# Patient Record
Sex: MALE | Race: WHITE | NOT HISPANIC OR LATINO | Employment: OTHER | URBAN - METROPOLITAN AREA
[De-identification: names, ages, dates, MRNs, and addresses within clinical notes are randomized per-mention and may not be internally consistent; named-entity substitution may affect disease eponyms.]

---

## 2020-01-01 ENCOUNTER — APPOINTMENT (OUTPATIENT)
Dept: CARDIOLOGY | Facility: MEDICAL CENTER | Age: 60
DRG: 064 | End: 2020-01-01
Attending: PSYCHIATRY & NEUROLOGY
Payer: COMMERCIAL

## 2020-01-01 ENCOUNTER — ANESTHESIA (OUTPATIENT)
Dept: ANESTHESIOLOGY | Facility: MEDICAL CENTER | Age: 60
DRG: 064 | End: 2020-01-01

## 2020-01-01 ENCOUNTER — APPOINTMENT (OUTPATIENT)
Dept: RADIOLOGY | Facility: MEDICAL CENTER | Age: 60
DRG: 064 | End: 2020-01-01
Attending: PSYCHIATRY & NEUROLOGY

## 2020-01-01 ENCOUNTER — APPOINTMENT (OUTPATIENT)
Dept: RADIOLOGY | Facility: MEDICAL CENTER | Age: 60
DRG: 064 | End: 2020-01-01
Attending: EMERGENCY MEDICINE

## 2020-01-01 ENCOUNTER — ANESTHESIA EVENT (OUTPATIENT)
Dept: ANESTHESIOLOGY | Facility: MEDICAL CENTER | Age: 60
DRG: 064 | End: 2020-01-01

## 2020-01-01 ENCOUNTER — HOSPITAL ENCOUNTER (INPATIENT)
Facility: MEDICAL CENTER | Age: 60
LOS: 1 days | DRG: 064 | End: 2020-09-13
Attending: EMERGENCY MEDICINE | Admitting: PSYCHIATRY & NEUROLOGY
Payer: COMMERCIAL

## 2020-01-01 ENCOUNTER — OFFICE VISIT (OUTPATIENT)
Dept: URGENT CARE | Facility: CLINIC | Age: 60
End: 2020-01-01
Payer: COMMERCIAL

## 2020-01-01 VITALS
DIASTOLIC BLOOD PRESSURE: 68 MMHG | WEIGHT: 260 LBS | RESPIRATION RATE: 18 BRPM | TEMPERATURE: 98 F | OXYGEN SATURATION: 97 % | SYSTOLIC BLOOD PRESSURE: 162 MMHG | HEART RATE: 77 BPM | HEIGHT: 66 IN | BODY MASS INDEX: 41.78 KG/M2

## 2020-01-01 VITALS
HEIGHT: 66 IN | HEART RATE: 92 BPM | WEIGHT: 256.62 LBS | RESPIRATION RATE: 24 BRPM | SYSTOLIC BLOOD PRESSURE: 121 MMHG | BODY MASS INDEX: 41.24 KG/M2 | DIASTOLIC BLOOD PRESSURE: 65 MMHG | OXYGEN SATURATION: 94 % | TEMPERATURE: 98.8 F

## 2020-01-01 DIAGNOSIS — B07.9 VIRAL WARTS, UNSPECIFIED TYPE: ICD-10-CM

## 2020-01-01 DIAGNOSIS — E66.01 MORBID OBESITY (HCC): ICD-10-CM

## 2020-01-01 DIAGNOSIS — R29.810 FACIAL DROOP: ICD-10-CM

## 2020-01-01 DIAGNOSIS — I63.9 ACUTE CVA (CEREBROVASCULAR ACCIDENT) (HCC): ICD-10-CM

## 2020-01-01 DIAGNOSIS — R53.1 LEFT-SIDED WEAKNESS: ICD-10-CM

## 2020-01-01 DIAGNOSIS — I62.9 INTRACRANIAL BLEEDING (HCC): ICD-10-CM

## 2020-01-01 DIAGNOSIS — I10 HYPERTENSION, UNSPECIFIED TYPE: ICD-10-CM

## 2020-01-01 LAB
ABO GROUP BLD: NORMAL
ACTION RANGE TRIGGERED IACRT: NO
ACTION RANGE TRIGGERED IACRT: NO
ALBUMIN SERPL BCP-MCNC: 4.7 G/DL (ref 3.2–4.9)
ALBUMIN/GLOB SERPL: 1.6 G/DL
ALP SERPL-CCNC: 78 U/L (ref 30–99)
ALT SERPL-CCNC: 30 U/L (ref 2–50)
AMPHET UR QL SCN: NEGATIVE
ANION GAP SERPL CALC-SCNC: 15 MMOL/L (ref 7–16)
ANION GAP SERPL CALC-SCNC: 15 MMOL/L (ref 7–16)
APTT PPP: 23.7 SEC (ref 24.7–36)
AST SERPL-CCNC: 20 U/L (ref 12–45)
BARBITURATES UR QL SCN: NEGATIVE
BASE EXCESS BLDA CALC-SCNC: -2 MMOL/L (ref -4–3)
BASE EXCESS BLDA CALC-SCNC: -3 MMOL/L (ref -4–3)
BASOPHILS # BLD AUTO: 0.2 % (ref 0–1.8)
BASOPHILS # BLD AUTO: 0.6 % (ref 0–1.8)
BASOPHILS # BLD: 0.03 K/UL (ref 0–0.12)
BASOPHILS # BLD: 0.06 K/UL (ref 0–0.12)
BENZODIAZ UR QL SCN: NEGATIVE
BILIRUB SERPL-MCNC: 0.3 MG/DL (ref 0.1–1.5)
BLD GP AB SCN SERPL QL: NORMAL
BODY TEMPERATURE: ABNORMAL DEGREES
BODY TEMPERATURE: ABNORMAL DEGREES
BUN SERPL-MCNC: 11 MG/DL (ref 8–22)
BUN SERPL-MCNC: 14 MG/DL (ref 8–22)
BZE UR QL SCN: NEGATIVE
CALCIUM SERPL-MCNC: 9 MG/DL (ref 8.5–10.5)
CALCIUM SERPL-MCNC: 9.1 MG/DL (ref 8.5–10.5)
CANNABINOIDS UR QL SCN: NEGATIVE
CFT BLD TEG: 3.6 MIN (ref 5–10)
CHLORIDE SERPL-SCNC: 100 MMOL/L (ref 96–112)
CHLORIDE SERPL-SCNC: 105 MMOL/L (ref 96–112)
CHOLEST SERPL-MCNC: 216 MG/DL (ref 100–199)
CLOT ANGLE BLD TEG: 63.1 DEGREES (ref 53–72)
CLOT LYSIS 30M P MA LENFR BLD TEG: 0 % (ref 0–8)
CO2 BLDA-SCNC: 24 MMOL/L (ref 20–33)
CO2 BLDA-SCNC: 26 MMOL/L (ref 20–33)
CO2 SERPL-SCNC: 21 MMOL/L (ref 20–33)
CO2 SERPL-SCNC: 22 MMOL/L (ref 20–33)
COVID ORDER STATUS COVID19: NORMAL
CREAT SERPL-MCNC: 0.96 MG/DL (ref 0.5–1.4)
CREAT SERPL-MCNC: 0.98 MG/DL (ref 0.5–1.4)
CT.EXTRINSIC BLD ROTEM: 2 MIN (ref 1–3)
EKG IMPRESSION: NORMAL
EOSINOPHIL # BLD AUTO: 0.02 K/UL (ref 0–0.51)
EOSINOPHIL # BLD AUTO: 0.17 K/UL (ref 0–0.51)
EOSINOPHIL NFR BLD: 0.1 % (ref 0–6.9)
EOSINOPHIL NFR BLD: 1.8 % (ref 0–6.9)
ERYTHROCYTE [DISTWIDTH] IN BLOOD BY AUTOMATED COUNT: 50.4 FL (ref 35.9–50)
ERYTHROCYTE [DISTWIDTH] IN BLOOD BY AUTOMATED COUNT: 51.1 FL (ref 35.9–50)
EST. AVERAGE GLUCOSE BLD GHB EST-MCNC: 169 MG/DL
GLOBULIN SER CALC-MCNC: 3 G/DL (ref 1.9–3.5)
GLUCOSE BLD-MCNC: 144 MG/DL (ref 65–99)
GLUCOSE BLD-MCNC: 160 MG/DL (ref 65–99)
GLUCOSE BLD-MCNC: 161 MG/DL (ref 65–99)
GLUCOSE SERPL-MCNC: 150 MG/DL (ref 65–99)
GLUCOSE SERPL-MCNC: 152 MG/DL (ref 65–99)
HBA1C MFR BLD: 7.5 % (ref 0–5.6)
HCO3 BLDA-SCNC: 23 MMOL/L (ref 17–25)
HCO3 BLDA-SCNC: 24.4 MMOL/L (ref 17–25)
HCT VFR BLD AUTO: 53.4 % (ref 42–52)
HCT VFR BLD AUTO: 55.9 % (ref 42–52)
HDLC SERPL-MCNC: 39 MG/DL
HGB BLD-MCNC: 17.5 G/DL (ref 14–18)
HGB BLD-MCNC: 18.4 G/DL (ref 14–18)
HOROWITZ INDEX BLDA+IHG-RTO: 119 MM[HG]
HOROWITZ INDEX BLDA+IHG-RTO: 158 MM[HG]
IMM GRANULOCYTES # BLD AUTO: 0.05 K/UL (ref 0–0.11)
IMM GRANULOCYTES # BLD AUTO: 0.05 K/UL (ref 0–0.11)
IMM GRANULOCYTES NFR BLD AUTO: 0.4 % (ref 0–0.9)
IMM GRANULOCYTES NFR BLD AUTO: 0.5 % (ref 0–0.9)
INR PPP: 0.9 (ref 0.87–1.13)
INST. QUALIFIED PATIENT IIQPT: YES
INST. QUALIFIED PATIENT IIQPT: YES
LDLC SERPL CALC-MCNC: 150 MG/DL
LV EJECT FRACT  99904: 70
LYMPHOCYTES # BLD AUTO: 1.14 K/UL (ref 1–4.8)
LYMPHOCYTES # BLD AUTO: 2.45 K/UL (ref 1–4.8)
LYMPHOCYTES NFR BLD: 25.5 % (ref 22–41)
LYMPHOCYTES NFR BLD: 8.4 % (ref 22–41)
MAGNESIUM SERPL-MCNC: 2.2 MG/DL (ref 1.5–2.5)
MCF BLD TEG: 65.5 MM (ref 50–70)
MCH RBC QN AUTO: 30.5 PG (ref 27–33)
MCH RBC QN AUTO: 30.8 PG (ref 27–33)
MCHC RBC AUTO-ENTMCNC: 32.8 G/DL (ref 33.7–35.3)
MCHC RBC AUTO-ENTMCNC: 32.9 G/DL (ref 33.7–35.3)
MCV RBC AUTO: 92.7 FL (ref 81.4–97.8)
MCV RBC AUTO: 93.8 FL (ref 81.4–97.8)
METHADONE UR QL SCN: NEGATIVE
MONOCYTES # BLD AUTO: 1.08 K/UL (ref 0–0.85)
MONOCYTES # BLD AUTO: 1.41 K/UL (ref 0–0.85)
MONOCYTES NFR BLD AUTO: 10.4 % (ref 0–13.4)
MONOCYTES NFR BLD AUTO: 11.2 % (ref 0–13.4)
NEUTROPHILS # BLD AUTO: 10.85 K/UL (ref 1.82–7.42)
NEUTROPHILS # BLD AUTO: 5.8 K/UL (ref 1.82–7.42)
NEUTROPHILS NFR BLD: 60.4 % (ref 44–72)
NEUTROPHILS NFR BLD: 80.5 % (ref 44–72)
NRBC # BLD AUTO: 0 K/UL
NRBC # BLD AUTO: 0 K/UL
NRBC BLD-RTO: 0 /100 WBC
NRBC BLD-RTO: 0 /100 WBC
O2/TOTAL GAS SETTING VFR VENT: 100 %
O2/TOTAL GAS SETTING VFR VENT: 80 %
OPIATES UR QL SCN: NEGATIVE
OXYCODONE UR QL SCN: NEGATIVE
PA AA BLD-ACNC: 60.3 %
PA ADP BLD-ACNC: 95.6 %
PCO2 BLDA: 45.3 MMHG (ref 26–37)
PCO2 BLDA: 46.5 MMHG (ref 26–37)
PCO2 TEMP ADJ BLDA: 44.8 MMHG (ref 26–37)
PCO2 TEMP ADJ BLDA: 47 MMHG (ref 26–37)
PCP UR QL SCN: NEGATIVE
PH BLDA: 7.31 [PH] (ref 7.4–7.5)
PH BLDA: 7.33 [PH] (ref 7.4–7.5)
PH TEMP ADJ BLDA: 7.32 [PH] (ref 7.4–7.5)
PH TEMP ADJ BLDA: 7.33 [PH] (ref 7.4–7.5)
PHOSPHATE SERPL-MCNC: 3.9 MG/DL (ref 2.5–4.5)
PLATELET # BLD AUTO: 229 K/UL (ref 164–446)
PLATELET # BLD AUTO: 289 K/UL (ref 164–446)
PMV BLD AUTO: 10.6 FL (ref 9–12.9)
PMV BLD AUTO: 9.4 FL (ref 9–12.9)
PO2 BLDA: 119 MMHG (ref 64–87)
PO2 BLDA: 126 MMHG (ref 64–87)
PO2 TEMP ADJ BLDA: 118 MMHG (ref 64–87)
PO2 TEMP ADJ BLDA: 128 MMHG (ref 64–87)
POTASSIUM SERPL-SCNC: 4.4 MMOL/L (ref 3.6–5.5)
POTASSIUM SERPL-SCNC: 4.5 MMOL/L (ref 3.6–5.5)
PROPOXYPH UR QL SCN: NEGATIVE
PROT SERPL-MCNC: 7.7 G/DL (ref 6–8.2)
PROTHROMBIN TIME: 12.4 SEC (ref 12–14.6)
RBC # BLD AUTO: 5.69 M/UL (ref 4.7–6.1)
RBC # BLD AUTO: 6.03 M/UL (ref 4.7–6.1)
RH BLD: NORMAL
SAO2 % BLDA: 98 % (ref 93–99)
SAO2 % BLDA: 99 % (ref 93–99)
SARS-COV-2 RNA RESP QL NAA+PROBE: NOTDETECTED
SODIUM SERPL-SCNC: 136 MMOL/L (ref 135–145)
SODIUM SERPL-SCNC: 142 MMOL/L (ref 135–145)
SPECIMEN DRAWN FROM PATIENT: ABNORMAL
SPECIMEN DRAWN FROM PATIENT: ABNORMAL
SPECIMEN SOURCE: NORMAL
TEG ALGORITHM TGALG: ABNORMAL
TRIGL SERPL-MCNC: 135 MG/DL (ref 0–149)
TROPONIN T SERPL-MCNC: 13 NG/L (ref 6–19)
TROPONIN T SERPL-MCNC: 17 NG/L (ref 6–19)
WBC # BLD AUTO: 13.5 K/UL (ref 4.8–10.8)
WBC # BLD AUTO: 9.6 K/UL (ref 4.8–10.8)

## 2020-01-01 PROCEDURE — 302214 INTUBATION BOX: Performed by: EMERGENCY MEDICINE

## 2020-01-01 PROCEDURE — 700111 HCHG RX REV CODE 636 W/ 250 OVERRIDE (IP): Performed by: INTERNAL MEDICINE

## 2020-01-01 PROCEDURE — 99223 1ST HOSP IP/OBS HIGH 75: CPT | Performed by: PSYCHIATRY & NEUROLOGY

## 2020-01-01 PROCEDURE — 93010 ELECTROCARDIOGRAM REPORT: CPT | Performed by: INTERNAL MEDICINE

## 2020-01-01 PROCEDURE — 31500 INSERT EMERGENCY AIRWAY: CPT

## 2020-01-01 PROCEDURE — 84484 ASSAY OF TROPONIN QUANT: CPT

## 2020-01-01 PROCEDURE — 71045 X-RAY EXAM CHEST 1 VIEW: CPT

## 2020-01-01 PROCEDURE — 770022 HCHG ROOM/CARE - ICU (200)

## 2020-01-01 PROCEDURE — 700105 HCHG RX REV CODE 258: Performed by: PSYCHIATRY & NEUROLOGY

## 2020-01-01 PROCEDURE — 700101 HCHG RX REV CODE 250: Performed by: PSYCHIATRY & NEUROLOGY

## 2020-01-01 PROCEDURE — 99291 CRITICAL CARE FIRST HOUR: CPT | Performed by: PSYCHIATRY & NEUROLOGY

## 2020-01-01 PROCEDURE — 94770 HCHG CO2 EXPIRED GAS DETERMINATION: CPT

## 2020-01-01 PROCEDURE — U0003 INFECTIOUS AGENT DETECTION BY NUCLEIC ACID (DNA OR RNA); SEVERE ACUTE RESPIRATORY SYNDROME CORONAVIRUS 2 (SARS-COV-2) (CORONAVIRUS DISEASE [COVID-19]), AMPLIFIED PROBE TECHNIQUE, MAKING USE OF HIGH THROUGHPUT TECHNOLOGIES AS DESCRIBED BY CMS-2020-01-R: HCPCS

## 2020-01-01 PROCEDURE — 86850 RBC ANTIBODY SCREEN: CPT

## 2020-01-01 PROCEDURE — 70450 CT HEAD/BRAIN W/O DYE: CPT

## 2020-01-01 PROCEDURE — 85610 PROTHROMBIN TIME: CPT

## 2020-01-01 PROCEDURE — 96365 THER/PROPH/DIAG IV INF INIT: CPT

## 2020-01-01 PROCEDURE — 96368 THER/DIAG CONCURRENT INF: CPT

## 2020-01-01 PROCEDURE — 82803 BLOOD GASES ANY COMBINATION: CPT

## 2020-01-01 PROCEDURE — 99291 CRITICAL CARE FIRST HOUR: CPT | Performed by: INTERNAL MEDICINE

## 2020-01-01 PROCEDURE — 85347 COAGULATION TIME ACTIVATED: CPT

## 2020-01-01 PROCEDURE — 99291 CRITICAL CARE FIRST HOUR: CPT

## 2020-01-01 PROCEDURE — 85576 BLOOD PLATELET AGGREGATION: CPT | Mod: 91

## 2020-01-01 PROCEDURE — 93306 TTE W/DOPPLER COMPLETE: CPT

## 2020-01-01 PROCEDURE — 36600 WITHDRAWAL OF ARTERIAL BLOOD: CPT

## 2020-01-01 PROCEDURE — 83036 HEMOGLOBIN GLYCOSYLATED A1C: CPT

## 2020-01-01 PROCEDURE — 86900 BLOOD TYPING SEROLOGIC ABO: CPT

## 2020-01-01 PROCEDURE — 36415 COLL VENOUS BLD VENIPUNCTURE: CPT

## 2020-01-01 PROCEDURE — 83735 ASSAY OF MAGNESIUM: CPT

## 2020-01-01 PROCEDURE — 80307 DRUG TEST PRSMV CHEM ANLYZR: CPT

## 2020-01-01 PROCEDURE — 96367 TX/PROPH/DG ADDL SEQ IV INF: CPT

## 2020-01-01 PROCEDURE — 99231 SBSQ HOSP IP/OBS SF/LOW 25: CPT | Performed by: PSYCHIATRY & NEUROLOGY

## 2020-01-01 PROCEDURE — 94003 VENT MGMT INPAT SUBQ DAY: CPT

## 2020-01-01 PROCEDURE — 99292 CRITICAL CARE ADDL 30 MIN: CPT | Performed by: INTERNAL MEDICINE

## 2020-01-01 PROCEDURE — 85025 COMPLETE CBC W/AUTO DIFF WBC: CPT

## 2020-01-01 PROCEDURE — 82962 GLUCOSE BLOOD TEST: CPT

## 2020-01-01 PROCEDURE — 99204 OFFICE O/P NEW MOD 45 MIN: CPT | Performed by: PHYSICIAN ASSISTANT

## 2020-01-01 PROCEDURE — 700111 HCHG RX REV CODE 636 W/ 250 OVERRIDE (IP): Performed by: PSYCHIATRY & NEUROLOGY

## 2020-01-01 PROCEDURE — C9803 HOPD COVID-19 SPEC COLLECT: HCPCS | Performed by: PSYCHIATRY & NEUROLOGY

## 2020-01-01 PROCEDURE — 93005 ELECTROCARDIOGRAM TRACING: CPT | Performed by: PSYCHIATRY & NEUROLOGY

## 2020-01-01 PROCEDURE — 94002 VENT MGMT INPAT INIT DAY: CPT

## 2020-01-01 PROCEDURE — 5A1935Z RESPIRATORY VENTILATION, LESS THAN 24 CONSECUTIVE HOURS: ICD-10-PCS | Performed by: PSYCHIATRY & NEUROLOGY

## 2020-01-01 PROCEDURE — 700111 HCHG RX REV CODE 636 W/ 250 OVERRIDE (IP): Performed by: EMERGENCY MEDICINE

## 2020-01-01 PROCEDURE — 0BH17EZ INSERTION OF ENDOTRACHEAL AIRWAY INTO TRACHEA, VIA NATURAL OR ARTIFICIAL OPENING: ICD-10-PCS | Performed by: ANESTHESIOLOGY

## 2020-01-01 PROCEDURE — 700102 HCHG RX REV CODE 250 W/ 637 OVERRIDE(OP): Performed by: PSYCHIATRY & NEUROLOGY

## 2020-01-01 PROCEDURE — 93306 TTE W/DOPPLER COMPLETE: CPT | Mod: 26 | Performed by: INTERNAL MEDICINE

## 2020-01-01 PROCEDURE — 86901 BLOOD TYPING SEROLOGIC RH(D): CPT

## 2020-01-01 PROCEDURE — 96375 TX/PRO/DX INJ NEW DRUG ADDON: CPT

## 2020-01-01 PROCEDURE — 94760 N-INVAS EAR/PLS OXIMETRY 1: CPT

## 2020-01-01 PROCEDURE — 80061 LIPID PANEL: CPT

## 2020-01-01 PROCEDURE — 80053 COMPREHEN METABOLIC PANEL: CPT

## 2020-01-01 PROCEDURE — 700117 HCHG RX CONTRAST REV CODE 255

## 2020-01-01 PROCEDURE — 84100 ASSAY OF PHOSPHORUS: CPT

## 2020-01-01 PROCEDURE — 85730 THROMBOPLASTIN TIME PARTIAL: CPT

## 2020-01-01 PROCEDURE — 80048 BASIC METABOLIC PNL TOTAL CA: CPT

## 2020-01-01 PROCEDURE — 85384 FIBRINOGEN ACTIVITY: CPT

## 2020-01-01 RX ORDER — LOSARTAN POTASSIUM 100 MG/1
100 TABLET ORAL DAILY
COMMUNITY

## 2020-01-01 RX ORDER — ONDANSETRON 4 MG/1
4 TABLET, ORALLY DISINTEGRATING ORAL EVERY 4 HOURS PRN
Status: DISCONTINUED | OUTPATIENT
Start: 2020-01-01 | End: 2020-01-01

## 2020-01-01 RX ORDER — PROPOFOL 10 MG/ML
INJECTION, EMULSION INTRAVENOUS
Status: COMPLETED | OUTPATIENT
Start: 2020-01-01 | End: 2020-01-01

## 2020-01-01 RX ORDER — LABETALOL HYDROCHLORIDE 5 MG/ML
10 INJECTION, SOLUTION INTRAVENOUS EVERY 4 HOURS PRN
Status: DISCONTINUED | OUTPATIENT
Start: 2020-01-01 | End: 2020-01-01

## 2020-01-01 RX ORDER — 3% SODIUM CHLORIDE 3 G/100ML
INJECTION, SOLUTION INTRAVENOUS CONTINUOUS
Status: DISCONTINUED | OUTPATIENT
Start: 2020-01-01 | End: 2020-01-01

## 2020-01-01 RX ORDER — SCOLOPAMINE TRANSDERMAL SYSTEM 1 MG/1
1 PATCH, EXTENDED RELEASE TRANSDERMAL
Status: DISCONTINUED | OUTPATIENT
Start: 2020-01-01 | End: 2020-01-01 | Stop reason: HOSPADM

## 2020-01-01 RX ORDER — POLYETHYLENE GLYCOL 3350 17 G/17G
1 POWDER, FOR SOLUTION ORAL
Status: DISCONTINUED | OUTPATIENT
Start: 2020-01-01 | End: 2020-01-01

## 2020-01-01 RX ORDER — MORPHINE SULFATE 10 MG/ML
5 INJECTION, SOLUTION INTRAMUSCULAR; INTRAVENOUS
Status: DISCONTINUED | OUTPATIENT
Start: 2020-01-01 | End: 2020-01-01 | Stop reason: HOSPADM

## 2020-01-01 RX ORDER — FEXOFENADINE HCL 180 MG/1
TABLET ORAL
COMMUNITY
End: 2020-01-01

## 2020-01-01 RX ORDER — TRAZODONE HYDROCHLORIDE 50 MG/1
50-100 TABLET ORAL NIGHTLY
COMMUNITY

## 2020-01-01 RX ORDER — ATROPINE SULFATE 10 MG/ML
2 SOLUTION/ DROPS OPHTHALMIC EVERY 4 HOURS PRN
Status: DISCONTINUED | OUTPATIENT
Start: 2020-01-01 | End: 2020-01-01 | Stop reason: HOSPADM

## 2020-01-01 RX ORDER — ONDANSETRON 2 MG/ML
8 INJECTION INTRAMUSCULAR; INTRAVENOUS EVERY 6 HOURS PRN
Status: DISCONTINUED | OUTPATIENT
Start: 2020-01-01 | End: 2020-01-01 | Stop reason: HOSPADM

## 2020-01-01 RX ORDER — IPRATROPIUM BROMIDE AND ALBUTEROL SULFATE 2.5; .5 MG/3ML; MG/3ML
3 SOLUTION RESPIRATORY (INHALATION)
Status: DISCONTINUED | OUTPATIENT
Start: 2020-01-01 | End: 2020-01-01

## 2020-01-01 RX ORDER — EMPAGLIFLOZIN 25 MG/1
TABLET, FILM COATED ORAL
COMMUNITY
Start: 2019-06-20 | End: 2020-01-01

## 2020-01-01 RX ORDER — LABETALOL HYDROCHLORIDE 5 MG/ML
20 INJECTION, SOLUTION INTRAVENOUS ONCE
Status: COMPLETED | OUTPATIENT
Start: 2020-01-01 | End: 2020-01-01

## 2020-01-01 RX ORDER — LORAZEPAM 2 MG/ML
2 INJECTION INTRAMUSCULAR
Status: DISCONTINUED | OUTPATIENT
Start: 2020-01-01 | End: 2020-01-01

## 2020-01-01 RX ORDER — GLYCOPYRROLATE 1 MG/1
1 TABLET ORAL 3 TIMES DAILY PRN
Status: DISCONTINUED | OUTPATIENT
Start: 2020-01-01 | End: 2020-01-01 | Stop reason: HOSPADM

## 2020-01-01 RX ORDER — 3% SODIUM CHLORIDE 3 G/100ML
500 INJECTION, SOLUTION INTRAVENOUS CONTINUOUS
Status: DISCONTINUED | OUTPATIENT
Start: 2020-01-01 | End: 2020-01-01

## 2020-01-01 RX ORDER — ONDANSETRON 4 MG/1
8 TABLET, ORALLY DISINTEGRATING ORAL EVERY 8 HOURS PRN
Status: DISCONTINUED | OUTPATIENT
Start: 2020-01-01 | End: 2020-01-01 | Stop reason: HOSPADM

## 2020-01-01 RX ORDER — ACETAMINOPHEN 650 MG/1
650 SUPPOSITORY RECTAL EVERY 4 HOURS PRN
Status: DISCONTINUED | OUTPATIENT
Start: 2020-01-01 | End: 2020-01-01

## 2020-01-01 RX ORDER — AMLODIPINE BESYLATE 5 MG/1
5 TABLET ORAL DAILY
COMMUNITY
Start: 2020-01-01

## 2020-01-01 RX ORDER — DEXTROSE MONOHYDRATE 25 G/50ML
50 INJECTION, SOLUTION INTRAVENOUS
Status: DISCONTINUED | OUTPATIENT
Start: 2020-01-01 | End: 2020-01-01

## 2020-01-01 RX ORDER — LORAZEPAM 2 MG/ML
1 CONCENTRATE ORAL
Status: DISCONTINUED | OUTPATIENT
Start: 2020-01-01 | End: 2020-01-01 | Stop reason: HOSPADM

## 2020-01-01 RX ORDER — NABUMETONE 750 MG/1
750 TABLET, FILM COATED ORAL 2 TIMES DAILY
COMMUNITY

## 2020-01-01 RX ORDER — BLOOD-GLUCOSE METER
EACH MISCELLANEOUS
COMMUNITY
Start: 2019-02-08 | End: 2020-01-01

## 2020-01-01 RX ORDER — MORPHINE SULFATE 10 MG/ML
10 INJECTION, SOLUTION INTRAMUSCULAR; INTRAVENOUS
Status: DISCONTINUED | OUTPATIENT
Start: 2020-01-01 | End: 2020-01-01 | Stop reason: HOSPADM

## 2020-01-01 RX ORDER — MIDAZOLAM HYDROCHLORIDE 1 MG/ML
INJECTION INTRAMUSCULAR; INTRAVENOUS
Status: COMPLETED
Start: 2020-01-01 | End: 2020-01-01

## 2020-01-01 RX ORDER — PANTOPRAZOLE SODIUM 40 MG/1
40 TABLET, DELAYED RELEASE ORAL DAILY
COMMUNITY

## 2020-01-01 RX ORDER — BISACODYL 10 MG
10 SUPPOSITORY, RECTAL RECTAL
Status: DISCONTINUED | OUTPATIENT
Start: 2020-01-01 | End: 2020-01-01

## 2020-01-01 RX ORDER — AMOXICILLIN 250 MG
2 CAPSULE ORAL 2 TIMES DAILY
Status: DISCONTINUED | OUTPATIENT
Start: 2020-01-01 | End: 2020-01-01

## 2020-01-01 RX ORDER — ENALAPRILAT 1.25 MG/ML
1.25 INJECTION INTRAVENOUS EVERY 6 HOURS PRN
Status: DISCONTINUED | OUTPATIENT
Start: 2020-01-01 | End: 2020-01-01

## 2020-01-01 RX ORDER — MIDAZOLAM HYDROCHLORIDE 1 MG/ML
5 INJECTION INTRAMUSCULAR; INTRAVENOUS ONCE
Status: COMPLETED | OUTPATIENT
Start: 2020-01-01 | End: 2020-01-01

## 2020-01-01 RX ORDER — GLIPIZIDE 5 MG/1
15 TABLET ORAL DAILY
COMMUNITY

## 2020-01-01 RX ORDER — CITALOPRAM HYDROBROMIDE 10 MG/1
10 TABLET ORAL 3 TIMES DAILY
COMMUNITY
Start: 2019-03-12

## 2020-01-01 RX ORDER — MINOCYCLINE HYDROCHLORIDE 100 MG/1
100 CAPSULE ORAL 2 TIMES DAILY
COMMUNITY

## 2020-01-01 RX ORDER — ACETAMINOPHEN 650 MG/1
325 SUPPOSITORY RECTAL EVERY 6 HOURS PRN
Status: DISCONTINUED | OUTPATIENT
Start: 2020-01-01 | End: 2020-01-01

## 2020-01-01 RX ORDER — SALICYLIC ACID 275 MG/ML
1 SOLUTION TOPICAL DAILY
Qty: 10 ML | Refills: 1 | Status: SHIPPED | OUTPATIENT
Start: 2020-01-01 | End: 2020-01-01

## 2020-01-01 RX ORDER — ATORVASTATIN CALCIUM 20 MG/1
20 TABLET, FILM COATED ORAL DAILY
COMMUNITY
Start: 2020-01-01

## 2020-01-01 RX ORDER — SUCCINYLCHOLINE CHLORIDE 20 MG/ML
INJECTION INTRAMUSCULAR; INTRAVENOUS
Status: COMPLETED | OUTPATIENT
Start: 2020-01-01 | End: 2020-01-01

## 2020-01-01 RX ORDER — LORAZEPAM 2 MG/ML
1-2 INJECTION INTRAMUSCULAR
Status: DISCONTINUED | OUTPATIENT
Start: 2020-01-01 | End: 2020-01-01 | Stop reason: HOSPADM

## 2020-01-01 RX ORDER — MANNITOL 250 MG/ML
100 INJECTION, SOLUTION INTRAVENOUS ONCE
Status: COMPLETED | OUTPATIENT
Start: 2020-01-01 | End: 2020-01-01

## 2020-01-01 RX ORDER — GLYCOPYRROLATE 0.2 MG/ML
0.2 INJECTION INTRAMUSCULAR; INTRAVENOUS 3 TIMES DAILY PRN
Status: DISCONTINUED | OUTPATIENT
Start: 2020-01-01 | End: 2020-01-01 | Stop reason: HOSPADM

## 2020-01-01 RX ORDER — SODIUM CHLORIDE 9 MG/ML
INJECTION, SOLUTION INTRAVENOUS CONTINUOUS
Status: DISCONTINUED | OUTPATIENT
Start: 2020-01-01 | End: 2020-01-01

## 2020-01-01 RX ORDER — ACETAMINOPHEN 325 MG/1
650 TABLET ORAL EVERY 4 HOURS PRN
Status: DISCONTINUED | OUTPATIENT
Start: 2020-01-01 | End: 2020-01-01

## 2020-01-01 RX ORDER — FAMOTIDINE 20 MG/1
20 TABLET, FILM COATED ORAL EVERY 12 HOURS
Status: DISCONTINUED | OUTPATIENT
Start: 2020-01-01 | End: 2020-01-01

## 2020-01-01 RX ORDER — ONDANSETRON 2 MG/ML
4 INJECTION INTRAMUSCULAR; INTRAVENOUS EVERY 4 HOURS PRN
Status: DISCONTINUED | OUTPATIENT
Start: 2020-01-01 | End: 2020-01-01

## 2020-01-01 RX ADMIN — LORAZEPAM 2 MG: 2 INJECTION INTRAMUSCULAR; INTRAVENOUS at 14:02

## 2020-01-01 RX ADMIN — LABETALOL HYDROCHLORIDE 10 MG: 5 INJECTION, SOLUTION INTRAVENOUS at 18:49

## 2020-01-01 RX ADMIN — PROPOFOL 5 MCG/KG/MIN: 10 INJECTION, EMULSION INTRAVENOUS at 19:39

## 2020-01-01 RX ADMIN — MORPHINE SULFATE 10 MG: 10 INJECTION INTRAVENOUS at 14:02

## 2020-01-01 RX ADMIN — INSULIN HUMAN 1 UNITS: 100 INJECTION, SOLUTION PARENTERAL at 23:40

## 2020-01-01 RX ADMIN — SODIUM CHLORIDE 5 MG/HR: 9 INJECTION, SOLUTION INTRAVENOUS at 06:45

## 2020-01-01 RX ADMIN — SODIUM CHLORIDE 5 MG/HR: 9 INJECTION, SOLUTION INTRAVENOUS at 00:48

## 2020-01-01 RX ADMIN — MIDAZOLAM HYDROCHLORIDE 5 MG: 1 INJECTION, SOLUTION INTRAMUSCULAR; INTRAVENOUS at 18:23

## 2020-01-01 RX ADMIN — SODIUM CHLORIDE 500 ML: 3 INJECTION, SOLUTION INTRAVENOUS at 22:55

## 2020-01-01 RX ADMIN — Medication 100 MCG/HR: at 19:41

## 2020-01-01 RX ADMIN — IOHEXOL 80 ML: 350 INJECTION, SOLUTION INTRAVENOUS at 18:30

## 2020-01-01 RX ADMIN — SODIUM CHLORIDE 5 MG/HR: 9 INJECTION, SOLUTION INTRAVENOUS at 20:12

## 2020-01-01 RX ADMIN — SUCCINYLCHOLINE CHLORIDE 200 MG: 20 INJECTION, SOLUTION INTRAMUSCULAR; INTRAVENOUS at 18:23

## 2020-01-01 RX ADMIN — PROPOFOL 10 MCG/KG/MIN: 10 INJECTION, EMULSION INTRAVENOUS at 06:45

## 2020-01-01 RX ADMIN — SUCCINYLCHOLINE CHLORIDE 100 MG: 20 INJECTION, SOLUTION INTRAMUSCULAR; INTRAVENOUS at 18:07

## 2020-01-01 RX ADMIN — SODIUM CHLORIDE: 9 INJECTION, SOLUTION INTRAVENOUS at 22:45

## 2020-01-01 RX ADMIN — MIDAZOLAM HYDROCHLORIDE 5 MG: 1 INJECTION, SOLUTION INTRAMUSCULAR; INTRAVENOUS at 18:40

## 2020-01-01 RX ADMIN — PROPOFOL 100 MG: 10 INJECTION, EMULSION INTRAVENOUS at 18:05

## 2020-01-01 RX ADMIN — PROPOFOL 100 MG: 10 INJECTION, EMULSION INTRAVENOUS at 18:03

## 2020-01-01 RX ADMIN — IOHEXOL 40 ML: 350 INJECTION, SOLUTION INTRAVENOUS at 18:30

## 2020-01-01 RX ADMIN — MANNITOL 100 G: 12.5 INJECTION, SOLUTION INTRAVENOUS at 20:40

## 2020-01-01 ASSESSMENT — ENCOUNTER SYMPTOMS
CHILLS: 0
TINGLING: 0
VOMITING: 0
SORE THROAT: 0
BRUISES/BLEEDS EASILY: 0
PALPITATIONS: 0
SHORTNESS OF BREATH: 0
BLURRED VISION: 0
FEVER: 0
NAUSEA: 0
SENSORY CHANGE: 0

## 2020-01-01 ASSESSMENT — PAIN DESCRIPTION - PAIN TYPE
TYPE: ACUTE PAIN

## 2020-01-01 ASSESSMENT — PAIN SCALES - GENERAL: PAINLEVEL: 3=SLIGHT PAIN

## 2020-01-01 ASSESSMENT — FIBROSIS 4 INDEX: FIB4 SCORE: 0.78

## 2020-07-08 NOTE — PROGRESS NOTES
"Subjective:      Jeff Allred is a 60 y.o. male who presents with Warts (x 1 wk- right ring finger )      HPI:  This is a new problem. Jeff Allred is a 60 y.o. male who presents today for evaluation of possible wart on his right ring finger.  Patient reports that he has had this wart for quite some time but over the past week it is gotten more painful and has started to \"open up\".  Reports that just last night he went to the pharmacy and got some over-the-counter cryo gel but he states that it did not help at all.  He has no other warts in any other location.  He denies any injury.  No numbness or tingling.      Review of Systems   Constitutional: Negative for chills and fever.   HENT: Negative for sore throat.    Eyes: Negative for blurred vision.   Respiratory: Negative for shortness of breath.    Cardiovascular: Negative for chest pain and palpitations.   Gastrointestinal: Negative for nausea and vomiting.   Musculoskeletal: Negative for joint pain.   Skin:        Wart on right ring finger   Neurological: Negative for tingling and sensory change.   Endo/Heme/Allergies: Does not bruise/bleed easily.       PMH:  has no past medical history on file.  MEDS:   Current Outpatient Medications:   •  atorvastatin (LIPITOR) 20 MG Tab, Take  by mouth., Disp: , Rfl:   •  fexofenadine (ALLEGRA) 180 MG tablet, Take  by mouth., Disp: , Rfl:   •  Pantoprazole Sodium (PROTONIX PO), Take 40 mg by mouth every day., Disp: , Rfl:   •  amLODIPine (NORVASC) 5 MG Tab, Take  by mouth., Disp: , Rfl:   •  Blood Glucose Monitoring Suppl (ONETOUCH VERIO FLEX SYSTEM) w/Device Kit, by Does not apply route., Disp: , Rfl:   •  citalopram (CELEXA) 10 MG tablet, Take  by mouth., Disp: , Rfl:   •  glipiZIDE (GLUCOTROL) 5 MG Tab, Take 5 mg by mouth., Disp: , Rfl:   •  Empagliflozin (JARDIANCE) 25 MG Tab, Take ONE tablet by mouth daily (This medication replaces Jardiance 10 mg tablet. NOTE: new strength and direction), Disp: , Rfl:   • " " losartan (COZAAR) 25 MG Tab, Take 25 mg by mouth every day., Disp: , Rfl:   •  Salicylic Acid 27.5 % Liquid, 1 Application by Apply externally route every day., Disp: 10 mL, Rfl: 1  •  NABUMETONE PO, Take 750 mg by mouth., Disp: , Rfl:   ALLERGIES:   Allergies   Allergen Reactions   • Clams Food Allergy    • Shellfish-Derived Products Hives and Shortness of Breath   • Oxycodone-Acetaminophen Hives, Itching and Nausea     Other reaction(s): Itching, pruritis  Only when pt takes over 250mgs     • Rosuvastatin Unspecified     Other reaction(s): Altered mental status - mild, Unknown to patient  Muscle cramping, joint pain and fatigue  Muscle cramping, joint pain and fatigue     • Doxycycline Rash and Nausea     Body aches.   Body aches.   itching     • Hydrocodone-Acetaminophen Hives and Itching     Other reaction(s): Itching, pruritis   • Metformin Diarrhea     Other reaction(s): Diarrhea severe   • Pravastatin Itching     Other reaction(s): Itching, pruritis     SURGHX: No past surgical history on file.  SOCHX:    FH: Family history was reviewed, no pertinent findings to report     Objective:     BP (!) 162/68   Pulse 77   Temp 36.7 °C (98 °F)   Resp 18   Ht 1.676 m (5' 6\")   Wt 117.9 kg (260 lb)   SpO2 97%   BMI 41.97 kg/m²      Physical Exam  Constitutional:       Appearance: He is well-developed.   HENT:      Head: Normocephalic and atraumatic.      Right Ear: External ear normal.      Left Ear: External ear normal.   Eyes:      Conjunctiva/sclera: Conjunctivae normal.      Pupils: Pupils are equal, round, and reactive to light.   Cardiovascular:      Rate and Rhythm: Normal rate and regular rhythm.      Heart sounds: Normal heart sounds. No murmur.   Pulmonary:      Effort: Pulmonary effort is normal.      Breath sounds: Normal breath sounds. No wheezing.   Skin:     General: Skin is warm and dry.      Capillary Refill: Capillary refill takes less than 2 seconds.      Comments: Dorsal/ulnar aspect of right " ring finger exhibits a cutaneous wart near the DIP joint.  It is hyperkeratotic.  Is cracked in the center no visible bleeding or discharge.  It is mildly tender to palpation.  No surrounding erythema or induration.  Distal N/V intact.   Neurological:      Mental Status: He is alert and oriented to person, place, and time.   Psychiatric:         Behavior: Behavior normal.         Judgment: Judgment normal.            Assessment/Plan:     1. Viral warts, unspecified type  - REFERRAL TO DERMATOLOGY  - Salicylic Acid 27.5 % Liquid; 1 Application by Apply externally route every day.  Dispense: 10 mL; Refill: 1    *Discussed with patient that based on the location of the wart I do not want to pursue cryotherapy at this time due to the risk of nerve damage.  We will have patient do applications of salicylic acid and have him follow-up with dermatology.  Also discussed that patient can try application of duct tape to the affected area.        Differential Diagnosis, natural history, and supportive care discussed. Return to the Urgent Care or follow up with your PCP if symptoms fail to resolve, or for any new or worsening symptoms. Emergency room precautions discussed. Patient and/or family appears understanding of information.

## 2020-07-08 NOTE — PATIENT INSTRUCTIONS
How Salicylic acid works: Produces desquamation of hyperkeratotic epithelium via dissolution of the intercellular cement which causes the cornified tissue to swell, soften, macerate, and desquamate      Adults: Soak wart in warm water for 5 minutes. Dry area thoroughly. Apply to entire wart surface, allow to dry, and then apply a second time. Avoid contact with surrounding skin. Continue therapy once or twice daily. Resolution may be expected after 4 to 6 weeks; some warts may take longer to remove.      Warts    Warts are small growths on the skin. They are common and can occur on many areas of the body. A person may have one wart or several warts.  In many cases, warts do not require treatment. They usually go away on their own over a period of many months to a few years. If needed, warts that cause problems or do not go away on their own can be treated.  What are the causes?  Warts are caused by a type of virus that is called human papillomavirus (HPV).  · This virus can spread from person to person through direct contact.  · Warts can also spread to other areas of the body when a person scratches a wart and then scratches another area of his or her body.  What increases the risk?  You are more likely to develop this condition if:  · You are 10-20 years old.  · You have a weakened body defense system (immune system).  · You are .  What are the signs or symptoms?  The main symptom of this condition is small growths on the skin. Warts may:  · Be round or oval or have an irregular shape.  · Have a rough surface.  · Range in color from skin color to light yellow, brown, or gray.  · Generally be less than ½ inch (1.3 cm) in size.  · Go away and then come back again.  Most warts are painless, but some can be painful if they are large or occur in an area of the body where pressure will be applied to them, such as the bottom of the foot.  How is this diagnosed?  A wart can usually be diagnosed based on its  appearance. In some cases, a tissue sample may be removed (biopsy) to be looked at under a microscope.  How is this treated?  In many cases, warts do not need treatment. Sometimes treatment is desired. If treatment is needed or desired, options may include:  · Applying medicated solutions, creams, or patches to the wart. These may be over-the-counter or prescription medicines that make the skin soft so that layers will gradually shed away. In many cases, the medicine is applied one or two times per day and covered with a bandage.  · Putting duct tape over the top of the wart (occlusion). You will leave the tape in place for as long as told by your health care provider and then replace it with a new strip of tape. This is done until the wart goes away.  · Freezing the wart with liquid nitrogen (cryotherapy).  · Burning the wart with:  ? Laser treatment.  ? An electrified probe (electrocautery).  · Injection of a medicine (Candida antigen) into the wart to help the body's immune system fight off the wart.  · Surgery to remove the wart.  Follow these instructions at home:  Medicines  · Apply over-the-counter and prescription medicines only as told by your health care provider.  · Do not apply over-the-counter wart medicines to your face or genitals unless your health care provider tells you to do that.  Lifestyle  · Keep your immune system healthy. To do this:  ? Eat a healthy, balanced diet.  ? Get enough sleep.  ? Do not use any products that contain nicotine or tobacco, such as cigarettes and e-cigarettes. If you need help quitting, ask your health care provider.  General instructions    · Wash your hands after you touch a wart.  · Do not scratch or pick at a wart.  · Avoid shaving hair that is over a wart.  · Keep all follow-up visits as told by your health care provider. This is important.  Contact a health care provider if:  · Your warts do not improve after treatment.  · You have redness, swelling, or pain at  the site of a wart.  · You have bleeding from a wart that does not stop with light pressure.  · You have diabetes and you develop a wart.  Summary  · Warts are small growths on the skin. They are common and can occur on many areas of the body.  · In many cases, warts do not need treatment. Sometimes treatment is desired. If treatment is needed or desired, there are several treatment options.  · Apply over-the-counter and prescription medicines only as told by your health care provider.  · Wash your hands after you touch a wart.  · Keep all follow-up visits as told by your health care provider. This is important.  This information is not intended to replace advice given to you by your health care provider. Make sure you discuss any questions you have with your health care provider.  Document Released: 09/27/2006 Document Revised: 05/07/2019 Document Reviewed: 05/07/2019  Elsevier Patient Education © 2020 [employer]

## 2020-07-10 ENCOUNTER — APPOINTMENT (RX ONLY)
Dept: URBAN - METROPOLITAN AREA CLINIC 4 | Facility: CLINIC | Age: 60
Setting detail: DERMATOLOGY
End: 2020-07-10

## 2020-07-10 DIAGNOSIS — B07.8 OTHER VIRAL WARTS: ICD-10-CM

## 2020-07-10 PROBLEM — D48.5 NEOPLASM OF UNCERTAIN BEHAVIOR OF SKIN: Status: ACTIVE | Noted: 2020-07-10

## 2020-07-10 PROCEDURE — 11102 TANGNTL BX SKIN SINGLE LES: CPT

## 2020-07-10 PROCEDURE — ? BIOPSY BY SHAVE METHOD

## 2020-07-10 ASSESSMENT — LOCATION ZONE DERM: LOCATION ZONE: FINGER

## 2020-07-10 ASSESSMENT — LOCATION DETAILED DESCRIPTION DERM: LOCATION DETAILED: RIGHT RING DISTAL INTERPHALANGEAL JOINT

## 2020-07-10 ASSESSMENT — LOCATION SIMPLE DESCRIPTION DERM: LOCATION SIMPLE: RIGHT RING FINGER

## 2020-07-10 NOTE — PROCEDURE: BIOPSY BY SHAVE METHOD

## 2020-09-03 ENCOUNTER — APPOINTMENT (RX ONLY)
Dept: URBAN - METROPOLITAN AREA CLINIC 4 | Facility: CLINIC | Age: 60
Setting detail: DERMATOLOGY
End: 2020-09-03

## 2020-09-03 DIAGNOSIS — D485 NEOPLASM OF UNCERTAIN BEHAVIOR OF SKIN: ICD-10-CM

## 2020-09-03 DIAGNOSIS — L70.0 ACNE VULGARIS: ICD-10-CM

## 2020-09-03 DIAGNOSIS — L82.1 OTHER SEBORRHEIC KERATOSIS: ICD-10-CM

## 2020-09-03 PROBLEM — D48.5 NEOPLASM OF UNCERTAIN BEHAVIOR OF SKIN: Status: ACTIVE | Noted: 2020-09-03

## 2020-09-03 PROCEDURE — ? DIAGNOSIS COMMENT

## 2020-09-03 PROCEDURE — ? COUNSELING

## 2020-09-03 PROCEDURE — 11102 TANGNTL BX SKIN SINGLE LES: CPT

## 2020-09-03 PROCEDURE — 11103 TANGNTL BX SKIN EA SEP/ADDL: CPT

## 2020-09-03 PROCEDURE — ? PRESCRIPTION

## 2020-09-03 PROCEDURE — ? BIOPSY BY SHAVE METHOD

## 2020-09-03 PROCEDURE — 99214 OFFICE O/P EST MOD 30 MIN: CPT | Mod: 25

## 2020-09-03 RX ORDER — CLOBETASOL PROPIONATE 0.5 MG/ML
SOLUTION TOPICAL
Qty: 1 | Refills: 1 | Status: ERX | COMMUNITY
Start: 2020-09-03

## 2020-09-03 RX ORDER — TRETIONIN 0.25 MG/G
CREAM TOPICAL
Qty: 1 | Refills: 2 | Status: ERX | COMMUNITY
Start: 2020-09-03

## 2020-09-03 RX ORDER — CLINDAMYCIN PHOSPHATE 10 MG/ML
SOLUTION TOPICAL
Qty: 1 | Refills: 2 | Status: ERX | COMMUNITY
Start: 2020-09-03

## 2020-09-03 RX ADMIN — TRETIONIN: 0.25 CREAM TOPICAL at 00:00

## 2020-09-03 RX ADMIN — CLOBETASOL PROPIONATE: 0.5 SOLUTION TOPICAL at 00:00

## 2020-09-03 RX ADMIN — CLINDAMYCIN PHOSPHATE: 10 SOLUTION TOPICAL at 00:00

## 2020-09-03 ASSESSMENT — LOCATION SIMPLE DESCRIPTION DERM
LOCATION SIMPLE: ABDOMEN
LOCATION SIMPLE: RIGHT UPPER BACK
LOCATION SIMPLE: RIGHT RING FINGER
LOCATION SIMPLE: LEFT CHEEK
LOCATION SIMPLE: ANTERIOR SCALP

## 2020-09-03 ASSESSMENT — LOCATION ZONE DERM
LOCATION ZONE: SCALP
LOCATION ZONE: FINGER
LOCATION ZONE: TRUNK
LOCATION ZONE: FACE

## 2020-09-03 ASSESSMENT — LOCATION DETAILED DESCRIPTION DERM
LOCATION DETAILED: RIGHT DISTAL ULNAR DORSAL RING FINGER
LOCATION DETAILED: RIGHT LATERAL ABDOMEN
LOCATION DETAILED: RIGHT SUPERIOR MEDIAL UPPER BACK
LOCATION DETAILED: MID-FRONTAL SCALP
LOCATION DETAILED: PERIUMBILICAL SKIN
LOCATION DETAILED: LEFT CENTRAL MALAR CHEEK

## 2020-09-03 NOTE — PROCEDURE: COUNSELING
Detail Level: Detailed
Tazorac Pregnancy And Lactation Text: This medication is not safe during pregnancy. It is unknown if this medication is excreted in breast milk.
Erythromycin Pregnancy And Lactation Text: This medication is Pregnancy Category B and is considered safe during pregnancy. It is also excreted in breast milk.
Topical Sulfur Applications Pregnancy And Lactation Text: This medication is Pregnancy Category C and has an unknown safety profile during pregnancy. It is unknown if this topical medication is excreted in breast milk.
High Dose Vitamin A Pregnancy And Lactation Text: High dose vitamin A therapy is contraindicated during pregnancy and breast feeding.
Minocycline Counseling: Patient advised regarding possible photosensitivity and discoloration of the teeth, skin, lips, tongue and gums.  Patient instructed to avoid sunlight, if possible.  When exposed to sunlight, patients should wear protective clothing, sunglasses, and sunscreen.  The patient was instructed to call the office immediately if the following severe adverse effects occur:  hearing changes, easy bruising/bleeding, severe headache, or vision changes.  The patient verbalized understanding of the proper use and possible adverse effects of minocycline.  All of the patient's questions and concerns were addressed.
Benzoyl Peroxide Pregnancy And Lactation Text: This medication is Pregnancy Category C. It is unknown if benzoyl peroxide is excreted in breast milk.
Dapsone Pregnancy And Lactation Text: This medication is Pregnancy Category C and is not considered safe during pregnancy or breast feeding.
Spironolactone Counseling: Patient advised regarding risks of diarrhea, abdominal pain, hyperkalemia, birth defects (for female patients), liver toxicity and renal toxicity. The patient may need blood work to monitor liver and kidney function and potassium levels while on therapy. The patient verbalized understanding of the proper use and possible adverse effects of spironolactone.  All of the patient's questions and concerns were addressed.
Bactrim Counseling:  I discussed with the patient the risks of sulfa antibiotics including but not limited to GI upset, allergic reaction, drug rash, diarrhea, dizziness, photosensitivity, and yeast infections.  Rarely, more serious reactions can occur including but not limited to aplastic anemia, agranulocytosis, methemoglobinemia, blood dyscrasias, liver or kidney failure, lung infiltrates or desquamative/blistering drug rashes.
Bactrim Pregnancy And Lactation Text: This medication is Pregnancy Category D and is known to cause fetal risk.  It is also excreted in breast milk.
Spironolactone Pregnancy And Lactation Text: This medication can cause feminization of the male fetus and should be avoided during pregnancy. The active metabolite is also found in breast milk.
Topical Clindamycin Counseling: Patient counseled that this medication may cause skin irritation or allergic reactions.  In the event of skin irritation, the patient was advised to reduce the amount of the drug applied or use it less frequently.   The patient verbalized understanding of the proper use and possible adverse effects of clindamycin.  All of the patient's questions and concerns were addressed.
Isotretinoin Counseling: Patient should get monthly blood tests, not donate blood, not drive at night if vision affected, not share medication, and not undergo elective surgery for 6 months after tx completed. Side effects reviewed, pt to contact office should one occur.
Doxycycline Counseling:  Patient counseled regarding possible photosensitivity and increased risk for sunburn.  Patient instructed to avoid sunlight, if possible.  When exposed to sunlight, patients should wear protective clothing, sunglasses, and sunscreen.  The patient was instructed to call the office immediately if the following severe adverse effects occur:  hearing changes, easy bruising/bleeding, severe headache, or vision changes.  The patient verbalized understanding of the proper use and possible adverse effects of doxycycline.  All of the patient's questions and concerns were addressed.
Use Enhanced Medication Counseling?: No
Minocycline Pregnancy And Lactation Text: This medication is Pregnancy Category D and not consider safe during pregnancy. It is also excreted in breast milk.
Tetracycline Counseling: Patient counseled regarding possible photosensitivity and increased risk for sunburn.  Patient instructed to avoid sunlight, if possible.  When exposed to sunlight, patients should wear protective clothing, sunglasses, and sunscreen.  The patient was instructed to call the office immediately if the following severe adverse effects occur:  hearing changes, easy bruising/bleeding, severe headache, or vision changes.  The patient verbalized understanding of the proper use and possible adverse effects of tetracycline.  All of the patient's questions and concerns were addressed. Patient understands to avoid pregnancy while on therapy due to potential birth defects.
Topical Clindamycin Pregnancy And Lactation Text: This medication is Pregnancy Category B and is considered safe during pregnancy. It is unknown if it is excreted in breast milk.
Birth Control Pills Counseling: Birth Control Pill Counseling: I discussed with the patient the potential side effects of OCPs including but not limited to increased risk of stroke, heart attack, thrombophlebitis, deep venous thrombosis, hepatic adenomas, breast changes, GI upset, headaches, and depression.  The patient verbalized understanding of the proper use and possible adverse effects of OCPs. All of the patient's questions and concerns were addressed.
Isotretinoin Pregnancy And Lactation Text: This medication is Pregnancy Category X and is considered extremely dangerous during pregnancy. It is unknown if it is excreted in breast milk.
Topical Retinoid counseling:  Patient advised to apply a pea-sized amount only at bedtime and wait 30 minutes after washing their face before applying.  If too drying, patient may add a non-comedogenic moisturizer. The patient verbalized understanding of the proper use and possible adverse effects of retinoids.  All of the patient's questions and concerns were addressed.
Detail Level: Zone
Topical Retinoid Pregnancy And Lactation Text: This medication is Pregnancy Category C. It is unknown if this medication is excreted in breast milk.
Doxycycline Pregnancy And Lactation Text: This medication is Pregnancy Category D and not consider safe during pregnancy. It is also excreted in breast milk but is considered safe for shorter treatment courses.
Azithromycin Counseling:  I discussed with the patient the risks of azithromycin including but not limited to GI upset, allergic reaction, drug rash, diarrhea, and yeast infections.
Sarecycline Counseling: Patient advised regarding possible photosensitivity and discoloration of the teeth, skin, lips, tongue and gums.  Patient instructed to avoid sunlight, if possible.  When exposed to sunlight, patients should wear protective clothing, sunglasses, and sunscreen.  The patient was instructed to call the office immediately if the following severe adverse effects occur:  hearing changes, easy bruising/bleeding, severe headache, or vision changes.  The patient verbalized understanding of the proper use and possible adverse effects of sarecycline.  All of the patient's questions and concerns were addressed.
Topical Sulfur Applications Counseling: Topical Sulfur Counseling: Patient counseled that this medication may cause skin irritation or allergic reactions.  In the event of skin irritation, the patient was advised to reduce the amount of the drug applied or use it less frequently.   The patient verbalized understanding of the proper use and possible adverse effects of topical sulfur application.  All of the patient's questions and concerns were addressed.
Azithromycin Pregnancy And Lactation Text: This medication is considered safe during pregnancy and is also secreted in breast milk.
High Dose Vitamin A Counseling: Side effects reviewed, pt to contact office should one occur.
Birth Control Pills Pregnancy And Lactation Text: This medication should be avoided if pregnant and for the first 30 days post-partum.
Benzoyl Peroxide Counseling: Patient counseled that medicine may cause skin irritation and bleach clothing.  In the event of skin irritation, the patient was advised to reduce the amount of the drug applied or use it less frequently.   The patient verbalized understanding of the proper use and possible adverse effects of benzoyl peroxide.  All of the patient's questions and concerns were addressed.
Dapsone Counseling: I discussed with the patient the risks of dapsone including but not limited to hemolytic anemia, agranulocytosis, rashes, methemoglobinemia, kidney failure, peripheral neuropathy, headaches, GI upset, and liver toxicity.  Patients who start dapsone require monitoring including baseline LFTs and weekly CBCs for the first month, then every month thereafter.  The patient verbalized understanding of the proper use and possible adverse effects of dapsone.  All of the patient's questions and concerns were addressed.
Tazorac Counseling:  Patient advised that medication is irritating and drying.  Patient may need to apply sparingly and wash off after an hour before eventually leaving it on overnight.  The patient verbalized understanding of the proper use and possible adverse effects of tazorac.  All of the patient's questions and concerns were addressed.
Erythromycin Counseling:  I discussed with the patient the risks of erythromycin including but not limited to GI upset, allergic reaction, drug rash, diarrhea, increase in liver enzymes, and yeast infections.

## 2020-09-12 PROBLEM — G47.33 OSA (OBSTRUCTIVE SLEEP APNEA): Status: ACTIVE | Noted: 2020-01-01

## 2020-09-12 PROBLEM — J96.00 ACUTE RESPIRATORY FAILURE (HCC): Status: ACTIVE | Noted: 2020-01-01

## 2020-09-12 PROBLEM — E11.9 DM (DIABETES MELLITUS) (HCC): Status: ACTIVE | Noted: 2020-01-01

## 2020-09-12 PROBLEM — I61.9 ICH (INTRACEREBRAL HEMORRHAGE) (HCC): Status: ACTIVE | Noted: 2020-01-01

## 2020-09-12 PROBLEM — I10 HTN (HYPERTENSION): Status: ACTIVE | Noted: 2020-01-01

## 2020-09-13 PROBLEM — Z71.89 GOALS OF CARE, COUNSELING/DISCUSSION: Status: ACTIVE | Noted: 2020-01-01

## 2020-09-13 NOTE — CONSULTS
[facility] Neurology Stroke Consult:    Referring Physician: Nichelle Tavarez D.O.    Reason for consultation: Stroke    TPA Decision: No TPA due to ICH    HPI: Jeff Allred is a 60 y.o. male with history of HTN, DM, DAWIT presenting to the hospital for stroke and consulted for stroke. Patient was in massage parlor when he developed L sided weakness. EMS came in and GCS was 14 at the time. In transit patient vomited. At the time of arrival patient had L sided weakness and slurred speech. Due to concerns for airway protection he was intubated in E.R. CT Head W/O CST demonstrated an ICH in the R basal ganglia. No TPA given due to ICH.    ROS:     As above. All other systems reviewed and are negative.    Past Medical History:   As above    FHx:  Unable to obtain    SHx:   Unable to obtain    Allergies:  Allergies   Allergen Reactions   • Clams Food Allergy    • Shellfish-Derived Products Hives and Shortness of Breath   • Oxycodone-Acetaminophen Hives, Itching and Nausea     Other reaction(s): Itching, pruritis  Only when pt takes over 250mgs     • Rosuvastatin Unspecified     Other reaction(s): Altered mental status - mild, Unknown to patient  Muscle cramping, joint pain and fatigue  Muscle cramping, joint pain and fatigue     • Doxycycline Rash and Nausea     Body aches.   Body aches.   itching     • Hydrocodone-Acetaminophen Hives and Itching     Other reaction(s): Itching, pruritis   • Metformin Diarrhea     Other reaction(s): Diarrhea severe   • Pravastatin Itching     Other reaction(s): Itching, pruritis       Medications:    Current Facility-Administered Medications:   •  succinylcholine (ANECTINE) injection, , Intravenous, ED ONCE PRN, Nichelle Tavarez D.O., 200 mg at 09/12/20 1823  •  propofol (DIPRIVAN) injection, , Intravenous, ED ONCE PRN, Nichelle Tavarez D.O., 100 mg at 09/12/20 1805  •  niCARdipine (CARDENE) 25 mg in  mL Infusion, 0-15 mg/hr, Intravenous, Continuous, Tico Horta,  "M.D.    Current Outpatient Medications:   •  atorvastatin (LIPITOR) 20 MG Tab, Take  by mouth., Disp: , Rfl:   •  fexofenadine (ALLEGRA) 180 MG tablet, Take  by mouth., Disp: , Rfl:   •  NABUMETONE PO, Take 750 mg by mouth., Disp: , Rfl:   •  Pantoprazole Sodium (PROTONIX PO), Take 40 mg by mouth every day., Disp: , Rfl:   •  amLODIPine (NORVASC) 5 MG Tab, Take  by mouth., Disp: , Rfl:   •  Blood Glucose Monitoring Suppl (ONETOUCH VERIO FLEX SYSTEM) w/Device Kit, by Does not apply route., Disp: , Rfl:   •  citalopram (CELEXA) 10 MG tablet, Take  by mouth., Disp: , Rfl:   •  glipiZIDE (GLUCOTROL) 5 MG Tab, Take 5 mg by mouth., Disp: , Rfl:   •  Empagliflozin (JARDIANCE) 25 MG Tab, Take ONE tablet by mouth daily (This medication replaces Jardiance 10 mg tablet. NOTE: new strength and direction), Disp: , Rfl:   •  losartan (COZAAR) 25 MG Tab, Take 25 mg by mouth every day., Disp: , Rfl:   •  Salicylic Acid 27.5 % Liquid, 1 Application by Apply externally route every day., Disp: 10 mL, Rfl: 1    Vitals:   Vitals:    09/12/20 1809 09/12/20 1817 09/12/20 1819 09/12/20 1820   BP: 155/74 155/73 135/67    Pulse: 99 99 98    Resp:       SpO2: 97% 99% 99%    Weight:    120.2 kg (265 lb)   Height:    1.676 m (5' 6\")       Labs:  No results found for: PROTHROMBTM, INR   Lab Results   Component Value Date/Time    WBC 9.6 09/12/2020 05:52 PM    RBC 6.03 09/12/2020 05:52 PM    HEMOGLOBIN 18.4 (H) 09/12/2020 05:52 PM    HEMATOCRIT 55.9 (H) 09/12/2020 05:52 PM    MCV 92.7 09/12/2020 05:52 PM    MCH 30.5 09/12/2020 05:52 PM    MCHC 32.9 (L) 09/12/2020 05:52 PM    MPV 10.6 09/12/2020 05:52 PM    NEUTSPOLYS 60.40 09/12/2020 05:52 PM    LYMPHOCYTES 25.50 09/12/2020 05:52 PM    MONOCYTES 11.20 09/12/2020 05:52 PM    EOSINOPHILS 1.80 09/12/2020 05:52 PM    BASOPHILS 0.60 09/12/2020 05:52 PM      Imaging:  CT Head W/O CST personally reviewed w/ R basal ganglia ICH.     ICH score 1 for GCS    Physical Exam:     General: 59 y/o male in bed, " snoring, difficult to arouse.   Cardio: Normal S1/S2. No peripheral edema.   Pulm: CTAX2. No respiratory distress.   Skin: Warm, dry, no rashes or lesions   Psychiatric: Unable to evaluate.  HEENT: Atraumatic head, normal sclera and conjunctiva, moist oral mucosa. No lid lag.  Abdomen: Soft, non tender. No masses or hepatosplenomegaly.    Physical Exam:    NIH Stroke Scale:    1a. Level of Consciousness (Alert, drowsy, etc): 1= Drowsy    1b. LOC Questions (Month, age): 1= Answers one correctly    1c. LOC Commands (Open/close eyes make fist/let go): 0= Obeys both correctly    2.   Best Gaze (Eyes open - patient follows examiner's finger on face): 0= Normal    3.   Visual Fields (introduce visual stimulus/threat to patient's field quadrants): 0= No visual loss  4.   Facial Paresis (Show teeth, raise eyebrows and squeeze eyes shut): 2 = Partial     5a. Motor Arm - Left (Elevate arm to 90 degrees if patient is sitting, 45 degrees if  supine): 4= No movement    5b. Motor Arm - Right (Elevate arm to 90 degrees if patient is sitting, 45 degrees if supine): 0= No drift    6a. Motor Leg - Left (Elevate leg 30 degrees with patient supine): 4= No movement    6b. Motor Leg - Right  (Elevate leg 30 degrees with patient supine): 0= No drift    7.   Limb Ataxia (Finger-nose, heel down shin): 0= No ataxia    8.   Sensory (Pin prick to face, arm, trunk and leg - compare side to side): 2- Severe loss    9.  Best Language (Name item, describe a picture and read sentences): 0= No aphasia    10. Dysarthria (Evaluate speech clarity by patient repeating listed words): 2= Near to unintelligible or worse    11. Extinction and Inattention (Use information from prior testing to identify neglect or  double simultaneous stimuli testing): 0= No neglect    Total NIH Score: 16    Assessment/Plan:    Jeff Allred is a 60 y.o. male with history of HTN, DM, DAWIT presenting to the hospital for stroke and consulted for stroke. Patient had a R  basal ganglia ICH w/ some ventricular effacement on the lateral ventricles and third ventricle. Likely etiology hypertensive, as SBP was ~180's on arrival. Patient is currently intubated and sedated.     Plan:   -Consult NSGY  -Maintain SBP <160.   -Nicardipine drip ordered.  -Admit to ICU  -Neuro checks Q1H  -CT Head W/O CST in 6 hours (~midnight) to evaluate for worsening of hemorrhage.   -MR Brain W/O CST when able.  -Will follow.  -Plan discussed with consulting physician and patient's nurse      Tico Horta M.D., Diplomat of the American Board of Psychiatry and Neurology  Diplomat of ABP[employer] Epilepsy Subspecialty   Assistant Clinical Professor, [facility] Neurology Consultant

## 2020-09-13 NOTE — ED NOTES
Pharmacy Medication Reconciliation      Medication reconciliation updated and complete per pts home pharmacy  Pt home pharmacy:[facility]    Per pt home pharmacy pt is on an continuous dose of Minocin

## 2020-09-13 NOTE — FLOWSHEET NOTE
Conscious Sedation Respiratory Update    FiO2%: 40 % (09/13/20 0646)          Events/Summary/Plan: Pt/vent checked.  Pt remains sedated on vent at this time.  Pt not able to SBT so far this am. (09/13/20 0646)

## 2020-09-13 NOTE — PROGRESS NOTES
Hospital Neurology Progress Note:     Interval History: No acute events overnight.  Unable to obtain review of systems due to intubation.    Objective:   Vitals:    09/13/20 0815 09/13/20 0830 09/13/20 0845 09/13/20 0900   BP: 121/62 119/62 122/64 124/63   Pulse: 95 95 95 92   Resp: (!) 24 (!) 24 (!) 24 (!) 24   Temp:       TempSrc:       SpO2: 91% 96% 96% 96%   Weight:       Height:           Labs:     Lab Results   Component Value Date/Time    PROTHROMBTM 12.4 09/12/2020 05:52 PM    INR 0.90 09/12/2020 05:52 PM      Lab Results   Component Value Date/Time    WBC 13.5 (H) 09/13/2020 04:25 AM    RBC 5.69 09/13/2020 04:25 AM    HEMOGLOBIN 17.5 09/13/2020 04:25 AM    HEMATOCRIT 53.4 (H) 09/13/2020 04:25 AM    MCV 93.8 09/13/2020 04:25 AM    MCH 30.8 09/13/2020 04:25 AM    MCHC 32.8 (L) 09/13/2020 04:25 AM    MPV 9.4 09/13/2020 04:25 AM    NEUTSPOLYS 80.50 (H) 09/13/2020 04:25 AM    LYMPHOCYTES 8.40 (L) 09/13/2020 04:25 AM    MONOCYTES 10.40 09/13/2020 04:25 AM    EOSINOPHILS 0.10 09/13/2020 04:25 AM    BASOPHILS 0.20 09/13/2020 04:25 AM      Lab Results   Component Value Date/Time    SODIUM 142 09/13/2020 04:25 AM    POTASSIUM 4.5 09/13/2020 04:25 AM    CHLORIDE 105 09/13/2020 04:25 AM    CO2 22 09/13/2020 04:25 AM    GLUCOSE 152 (H) 09/13/2020 04:25 AM    BUN 11 09/13/2020 04:25 AM    CREATININE 0.96 09/13/2020 04:25 AM      Lab Results   Component Value Date/Time    CHOLSTRLTOT 216 (H) 09/12/2020 07:56 PM     (H) 09/12/2020 07:56 PM    HDL 39 (A) 09/12/2020 07:56 PM    TRIGLYCERIDE 135 09/12/2020 07:56 PM       Lab Results   Component Value Date/Time    ALKPHOSPHAT 78 09/12/2020 06:48 PM    ASTSGOT 20 09/12/2020 06:48 PM    ALTSGPT 30 09/12/2020 06:48 PM    TBILIRUBIN 0.3 09/12/2020 06:48 PM        Imaging/Testing:   CT head without contrast on 9/12/2020 was personally reviewed and showed stable hemorrhage.    Physical Exam:     General: 60-year-old male intubated in bed.  Cardio: Normal S1/S2. No peripheral  edema.   Pulm: CTAX2. No respiratory distress.   Skin: Warm, dry, no rashes or lesions   Psychiatric: Unable to assess.  HEENT: Atraumatic head, normal sclera and conjunctiva, moist oral mucosa. No lid lag.  Abdomen: Soft, non tender. No masses or hepatosplenomegaly.    Neurologic:  Mental Status: Intubated and sedated.  Does not follow commands.  No speech observed.  Cranial Nerves: Pupils equally round and unreactive to light.  Oculocephalic reflex absent.  Face is symmetric.  Motor: Decreased muscle tone of the left.  Normal bulk.  Moves the right side to painful stimulus.  Left side is flaccid.  Reflexes: Deferred  Coordination: Unable to assess  Sensation: No withdrawal to noxious stimulus on the right  Gait/Station: Unable to assess    Assessment/Plan:    Jeff Allred is a 60 y.o. male with history of HTN, DM, DAWIT presenting to the hospital for stroke and consulted for stroke. Patient had a R basal ganglia ICH w/ some ventricular effacement on the lateral ventricles and third ventricle. Likely etiology hypertensive, as SBP was ~180's on arrival. Patient is currently intubated and sedated.  Patient does plan to go to comfort care due to advanced directives.  Given the size of the patient's hemorrhage, this would be reasonable.  Would cancel MRI.  Since patient is going comfort care no further recommendations from a neurologic standpoint.  Please call with any further questions or concerns.    Plan:  -Cancel MRI of patient is going comfort care  -No further recommendations from a neurologic standpoint.  Please call with any further questions or concerns.  -Plan discussed with consulting physician and patient's nurse.     Tico Horta M.D., Diplomat of the American Board of Psychiatry and Neurology  Diplomat of [employer]BP[employer] Epilepsy Subspecialty   Assistant Clinical Professor, [facility] Neurology Consultant

## 2020-09-13 NOTE — CONSULTS
Referral # [id number]    Please call [phone] select 1, then select 2, and use the last [phone] digits of the referral number to contact the on-call coordinator with any updates.     Date: 09/13/20      Thank you for the referral of this patient. A chart review has been completed to determine suitability for organ and tissue donation.    *Donation is an option:    -Upon meeting the criteria for brain death this patient will be a potential candidate for organ donation, upon further evaluation.    -This patient may meet the criteria for DCD (donation after circulatory death). Further evaluation will be needed should the family decide to transition to comfort care.    *[facility] will continue to follow this case.    -Guidelines for the management of a potential organ donor have been provided for use at the physician's discretion.    -Please contact the clinical coordinator with any changes in the patient's neurological or hemodynamic status, family questions/concerns/decisions, or changes in plan of care.    -Organ donation should not be mentioned to the family until the physician has discussed the patient's diagnosis and grave prognosis. Discussion of organ donation should then be a planned, one-time coordinated event in collaboration with [facility].        Thank you for your continued support of organ and tissue donation.

## 2020-09-13 NOTE — ASSESSMENT & PLAN NOTE
Intubated in the ED for airway protect  Likely aspirated monitor for infection/bronch  Monitor off abx for now  RT/O2 protocols  Titration of ventilator therapy based on ABGs, waveform analysis and patient's status  Not candidate for SAT/SBT

## 2020-09-13 NOTE — FLOWSHEET NOTE
Conscious Sedation Respiratory Update    FiO2%: 40 % (09/13/20 1050)          Events/Summary/Plan: Pt was made comfort care.  Pt extubated per 's order and the family's wishes.  Family is present. (09/13/20 9193)

## 2020-09-13 NOTE — ED NOTES
Fentanyl, propofol, and nicardipine initiated per orders. New PIV established. Obtained blood sugar, result 161.     TEG and coag sent to lab.

## 2020-09-13 NOTE — PROGRESS NOTES
Patient seen and examined.    Will dictate note once this note is completed.    Held conference with the significant other who states that she has the power of  for medical decision making.    Prognosis is abysmal.

## 2020-09-13 NOTE — ED NOTES
Pt being prepared for intubation. MD Tavarez, RT, pharmacy and RNs at bedside. Pt being bagged at this time.

## 2020-09-13 NOTE — CONSULTS
Critical Care Consultation    Date of consult: 9/12/2020    Referring Physician  Nichelle Tavarez D.O.    Reason for Consultation  ICH    History of Presenting Illness  60 y.o. male w/ HTN, HLD, DAWIT and DM who presented 9/12/2020 with right deep cerebral nuclei ICH and IVH manifest as left sided weakness and dysarthria. Initially arrived as a GCS 14, became obtunded and was intubated. Per ERP difficult intubation by anesthesiology. Not on any antiplatelet or AC per review of EMR.    Code Status  No Order    Review of Systems  Review of Systems   Unable to perform ROS: Intubated       Past Medical History   has no past medical history on file.    Surgical History   has no past surgical history on file.    Family History  family history is not on file.    Social History       Medications  Home Medications    **Home medications have not yet been reviewed for this encounter**       Current Facility-Administered Medications   Medication Dose Route Frequency Provider Last Rate Last Dose   • succinylcholine (ANECTINE) injection   Intravenous ED ONCE PRN Nichelle Tavarez D.O.   200 mg at 09/12/20 1823   • propofol (DIPRIVAN) injection   Intravenous ED ONCE PRN Nichelle Tavarez D.O.   100 mg at 09/12/20 1805   • niCARdipine (CARDENE) 25 mg in  mL Infusion  0-15 mg/hr Intravenous Continuous Tico Horta M.D.       • Respiratory Therapy Consult   Nebulization Continuous RT Darshan Tracy M.D.       • ipratropium-albuterol (DUONEB) nebulizer solution  3 mL Nebulization Q2HRS PRN (RT) Darshan Tracy M.D.       • famotidine (PEPCID) tablet 20 mg  20 mg Enteral Tube Q12HRS Darshan Tracy M.D.        Or   • famotidine (PEPCID) injection 20 mg  20 mg Intravenous Q12HRS Darshan Tracy M.D.       • senna-docusate (PERICOLACE or SENOKOT S) 8.6-50 MG per tablet 2 Tab  2 Tab Enteral Tube BID Darshan Tracy M.D.        And   • polyethylene glycol/lytes (MIRALAX) PACKET 1 Packet  1 Packet Enteral Tube QDAY PRN Darshan B Peel,  M.D.        And   • magnesium hydroxide (MILK OF MAGNESIA) suspension 30 mL  30 mL Enteral Tube QDAY PRN Darshan Tracy M.D.        And   • bisacodyl (DULCOLAX) suppository 10 mg  10 mg Rectal QDAY PRN Darshan Tracy M.D.       • MD Alert...ICU Electrolyte Replacement per Pharmacy   Other PHARMACY TO DOSE Darshan Tracy M.D.       • lidocaine (XYLOCAINE) 1 % injection 1-2 mL  1-2 mL Tracheal Tube Q30 MIN PRN Darshan Tracy M.D.       • fentaNYL (SUBLIMAZE) injection 100 mcg  100 mcg Intravenous Q15 MIN PRN Darshan Tracy M.D.        And   • fentaNYL (SUBLIMAZE) injection 200 mcg  200 mcg Intravenous Q15 MIN PRN Darshan Tracy M.D.        And   • fentaNYL (SUBLIMAZE) 50 mcg/mL in 50mL (Continuous Infusion)   Intravenous Continuous Darshan Tracy M.D.        And   • propofol (DIPRIVAN) injection  0-80 mcg/kg/min Intravenous Continuous Darshan Tracy M.D.       • Respiratory Therapy Consult   Nebulization Continuous RT Darshan Tracy M.D.       • NS infusion   Intravenous Continuous Darshan Tracy M.D.       • LORazepam (ATIVAN) injection 2 mg  2 mg Intravenous Q5 MIN PRN Darshan Tracy M.D.       • acetaminophen (TYLENOL) tablet 650 mg  650 mg Oral Q4HRS PRN Darshan Tracy M.D.        Or   • acetaminophen (TYLENOL) suppository 650 mg  650 mg Rectal Q4HRS PRN Darshan Tracy M.D.       • ondansetron (ZOFRAN ODT) dispertab 4 mg  4 mg Oral Q4HRS PRN Darshan Tracy M.D.        Or   • ondansetron (ZOFRAN) syringe/vial injection 4 mg  4 mg Intravenous Q4HRS PRN Darshan Tracy M.D.       • niCARdipine (CARDENE) 25 mg in  mL Infusion  0-15 mg/hr Intravenous Continuous Darshan Tracy M.D.       • labetalol (NORMODYNE/TRANDATE) injection 10 mg  10 mg Intravenous Q4HRS PRN Darshan Tracy M.D.       • enalaprilat (VASOTEC) injection 1.25 mg  1.25 mg Intravenous Q6HRS PRN Darshan Tracy M.D.         Current Outpatient Medications   Medication Sig Dispense Refill   • atorvastatin (LIPITOR) 20 MG Tab Take  by  mouth.     • fexofenadine (ALLEGRA) 180 MG tablet Take  by mouth.     • NABUMETONE PO Take 750 mg by mouth.     • Pantoprazole Sodium (PROTONIX PO) Take 40 mg by mouth every day.     • amLODIPine (NORVASC) 5 MG Tab Take  by mouth.     • Blood Glucose Monitoring Suppl (ONETOUCH VERIO FLEX SYSTEM) w/Device Kit by Does not apply route.     • citalopram (CELEXA) 10 MG tablet Take  by mouth.     • glipiZIDE (GLUCOTROL) 5 MG Tab Take 5 mg by mouth.     • Empagliflozin (JARDIANCE) 25 MG Tab Take ONE tablet by mouth daily (This medication replaces Jardiance 10 mg tablet. NOTE: new strength and direction)     • losartan (COZAAR) 25 MG Tab Take 25 mg by mouth every day.     • Salicylic Acid 27.5 % Liquid 1 Application by Apply externally route every day. 10 mL 1       Allergies  Allergies   Allergen Reactions   • Clams Food Allergy    • Shellfish-Derived Products Hives and Shortness of Breath   • Oxycodone-Acetaminophen Hives, Itching and Nausea     Other reaction(s): Itching, pruritis  Only when pt takes over 250mgs     • Rosuvastatin Unspecified     Other reaction(s): Altered mental status - mild, Unknown to patient  Muscle cramping, joint pain and fatigue  Muscle cramping, joint pain and fatigue     • Doxycycline Rash and Nausea     Body aches.   Body aches.   itching     • Hydrocodone-Acetaminophen Hives and Itching     Other reaction(s): Itching, pruritis   • Metformin Diarrhea     Other reaction(s): Diarrhea severe   • Pravastatin Itching     Other reaction(s): Itching, pruritis       Vital Signs last 24 hours  Temp:  [37.1 °C (98.8 °F)] 37.1 °C (98.8 °F)  Pulse:  [97-99] 98  Resp:  [30] 30  BP: (135-194)/(67-94) 135/67  SpO2:  [97 %-99 %] 99 %    Physical Exam  Physical Exam  Constitutional:       General: He is in acute distress.      Appearance: He is obese. He is ill-appearing and toxic-appearing.   HENT:      Head: Normocephalic and atraumatic.      Right Ear: External ear normal.      Left Ear: External ear  normal.      Nose: Nose normal.   Eyes:      Pupils: Pupils are equal, round, and reactive to light.   Cardiovascular:      Rate and Rhythm: Regular rhythm. Tachycardia present.      Pulses: Normal pulses.   Pulmonary:      Comments: MV. Coarse breath sounds throughout  Abdominal:      General: Bowel sounds are normal. There is distension.   Musculoskeletal:      Left lower leg: Edema present.   Skin:     General: Skin is warm and dry.      Capillary Refill: Capillary refill takes 2 to 3 seconds.   Neurological:      Comments: GCS 6 [id number]. Does not follow commands. Brainstem reflexes intact. No w/d to nox stim on the left. W/d on the right to nox stim.         Fluids  No intake or output data in the 24 hours ending 09/12/20 1909    Laboratory  Recent Results (from the past 48 hour(s))   CBC WITH DIFFERENTIAL    Collection Time: 09/12/20  5:52 PM   Result Value Ref Range    WBC 9.6 4.8 - 10.8 K/uL    RBC 6.03 4.70 - 6.10 M/uL    Hemoglobin 18.4 (H) 14.0 - 18.0 g/dL    Hematocrit 55.9 (H) 42.0 - 52.0 %    MCV 92.7 81.4 - 97.8 fL    MCH 30.5 27.0 - 33.0 pg    MCHC 32.9 (L) 33.7 - 35.3 g/dL    RDW 50.4 (H) 35.9 - 50.0 fL    Platelet Count 289 164 - 446 K/uL    MPV 10.6 9.0 - 12.9 fL    Neutrophils-Polys 60.40 44.00 - 72.00 %    Lymphocytes 25.50 22.00 - 41.00 %    Monocytes 11.20 0.00 - 13.40 %    Eosinophils 1.80 0.00 - 6.90 %    Basophils 0.60 0.00 - 1.80 %    Immature Granulocytes 0.50 0.00 - 0.90 %    Nucleated RBC 0.00 /100 WBC    Neutrophils (Absolute) 5.80 1.82 - 7.42 K/uL    Lymphs (Absolute) 2.45 1.00 - 4.80 K/uL    Monos (Absolute) 1.08 (H) 0.00 - 0.85 K/uL    Eos (Absolute) 0.17 0.00 - 0.51 K/uL    Baso (Absolute) 0.06 0.00 - 0.12 K/uL    Immature Granulocytes (abs) 0.05 0.00 - 0.11 K/uL    NRBC (Absolute) 0.00 K/uL   PROTHROMBIN TIME    Collection Time: 09/12/20  5:52 PM   Result Value Ref Range    PT 12.4 12.0 - 14.6 sec    INR 0.90 0.87 - 1.13   APTT    Collection Time: 09/12/20  5:52 PM   Result Value  Ref Range    APTT 23.7 (L) 24.7 - 36.0 sec       Imaging  CT-HEAD W/O   Final Result         1.  Large acute parenchymal hemorrhage in the right cerebral hemisphere medially is identified which extends down into the right cerebral peduncle and edge of the brainstem.      2.  Hemorrhage is also present throughout the ventricular system. There is dilatation of the lateral ventricles which could be related to presence of hemorrhage within the third and fourth ventricles.      3.  Midline shift to the left side measures 6 mm. There is some mild effacement of sulci bilaterally.      These findings were discussed with LAZARO CONNELLY on 09/12/2020.      DX-CHEST-PORTABLE (1 VIEW)    (Results Pending)   EC-ECHOCARDIOGRAM COMPLETE W/O CONT    (Results Pending)       Assessment/Plan  ICH (intracerebral hemorrhage) (HCC)  Assessment & Plan  ICH score 2  Likely HTN related  Neurochecks per protocol  Goal SBP < 140 started on cardene gtt while in the ED  Some midline shift at present; start on 3% HTS goal 135-145 if decompensates can liberalize goal to 145-155  Repeat head CT in 6 hours  MRI at some point  PT/OT/SLP when appropriate  Place cortrak for enteral access    DAWIT (obstructive sleep apnea)  Assessment & Plan  Hx of     Acute respiratory failure (HCC)  Assessment & Plan  Intubated in the ED for airway protect  Likely aspirated monitor for infection/bronch  Monitor off abx for now  RT/O2 protocols  Titration of ventilator therapy based on ABGs, waveform analysis and patient's status  SAT/SBT when able (ABCDEF Bundle)      DM (diabetes mellitus) (HCC)  Assessment & Plan  SSI   Accuchecks  Hypoglycemia protocol    HTN (hypertension)  Assessment & Plan  Goal SBP < 140  Jayy gtt  Resume oral agents when appropriate      Discussed patient condition and risk of morbidity and/or mortality with RN, RT, Pharmacy, Code status disscussed, Charge nurse / hot rounds, Patient and neurology and neurosurgery.    The patient remains  critically ill.  Critical care time = 45 minutes in directly providing and coordinating critical care and extensive data review.  No time overlap and excludes procedures.

## 2020-09-13 NOTE — CARE PLAN
Vent # 2  8.0 @ 27  CMV  24 380 +8 40%    Summary: Received pt from ED, no SBT done at this time.

## 2020-09-13 NOTE — FLOWSHEET NOTE
Conscious Sedation Respiratory Update    FiO2%: 40 % (09/13/20 1050)          Events/Summary/Plan: Pt/vent checked.  No changes after rounds. (09/13/20 1050)

## 2020-09-13 NOTE — ED PROVIDER NOTES
ED Provider Note     Scribed for Nichelle Tavarez D.O. by Antione Laguna. 9/12/2020, 5:57 PM.     Primary care provider: Pcp Pt States None  Means of arrival: EMS         History obtained from: EMS  History limited by: Critical Condition    CHIEF COMPLAINT  Chief Complaint   Patient presents with   • Possible Stroke       HPI  Jeff Allred is a 60 y.o. male who presents to the emergency Department for evaluation of stroke like symptoms. Per EMS the patient was found at a massage parlor. He initially told EMS that he was experiencing left sided weakness, numbness, and facial droop. Patient vomited on his way to the hospital. Zofran given en route. On scene blood sugar was 109 and blood pressure was 177/100. Last normal was at 5:15 PM.     HPI limited due to patients critical condition.     REVIEW OF SYSTEMS  Pertinent positives include left sided weakness, left sided numbness, left sided facial droop, and vomiting.    See HPI for further details.     ROS limited due to patients critical condition.     PAST MEDICAL HISTORY  Hypertension  Morbid obesity    FAMILY HISTORY  No family history on file.    SOCIAL HISTORY  Patient is a retired [employer]  who currently lives with his ex-wife/girlfriend of 23 years.  Social History     Substance and Sexual Activity   Drug Use Not on file       SURGICAL HISTORY  No past surgical history on file.    CURRENT MEDICATIONS    Current Facility-Administered Medications:   •  succinylcholine (ANECTINE) injection, , Intravenous, ED ONCE PRN, Nichelle Tavarez D.O., 200 mg at 09/12/20 1823  •  propofol (DIPRIVAN) injection, , Intravenous, ED ONCE PRN, Nichelle Tavarez D.O., 100 mg at 09/12/20 1805  •  niCARdipine (CARDENE) 25 mg in  mL Infusion, 0-15 mg/hr, Intravenous, Continuous, Tico Horta M.D.    Current Outpatient Medications:   •  atorvastatin (LIPITOR) 20 MG Tab, Take  by mouth., Disp: , Rfl:   •  fexofenadine (ALLEGRA) 180 MG tablet, Take  by mouth.,  "Disp: , Rfl:   •  NABUMETONE PO, Take 750 mg by mouth., Disp: , Rfl:   •  Pantoprazole Sodium (PROTONIX PO), Take 40 mg by mouth every day., Disp: , Rfl:   •  amLODIPine (NORVASC) 5 MG Tab, Take  by mouth., Disp: , Rfl:   •  Blood Glucose Monitoring Suppl (ONETOUCH VERIO FLEX SYSTEM) w/Device Kit, by Does not apply route., Disp: , Rfl:   •  citalopram (CELEXA) 10 MG tablet, Take  by mouth., Disp: , Rfl:   •  glipiZIDE (GLUCOTROL) 5 MG Tab, Take 5 mg by mouth., Disp: , Rfl:   •  Empagliflozin (JARDIANCE) 25 MG Tab, Take ONE tablet by mouth daily (This medication replaces Jardiance 10 mg tablet. NOTE: new strength and direction), Disp: , Rfl:   •  losartan (COZAAR) 25 MG Tab, Take 25 mg by mouth every day., Disp: , Rfl:   •  Salicylic Acid 27.5 % Liquid, 1 Application by Apply externally route every day., Disp: 10 mL, Rfl: 1    ALLERGIES  Allergies   Allergen Reactions   • Clams Food Allergy    • Shellfish-Derived Products Hives and Shortness of Breath   • Oxycodone-Acetaminophen Hives, Itching and Nausea     Other reaction(s): Itching, pruritis  Only when pt takes over 250mgs     • Rosuvastatin Unspecified     Other reaction(s): Altered mental status - mild, Unknown to patient  Muscle cramping, joint pain and fatigue  Muscle cramping, joint pain and fatigue     • Doxycycline Rash and Nausea     Body aches.   Body aches.   itching     • Hydrocodone-Acetaminophen Hives and Itching     Other reaction(s): Itching, pruritis   • Metformin Diarrhea     Other reaction(s): Diarrhea severe   • Pravastatin Itching     Other reaction(s): Itching, pruritis       PHYSICAL EXAM  VITAL SIGNS: /67   Pulse 98   Resp (!) 30   Ht 1.676 m (5' 6\")   Wt 120.2 kg (265 lb)   SpO2 99%   BMI 42.77 kg/m²   NIH stroke scale was performed, please see nurses notes  Constitutional: Patient is a morbidly obese male in severe distress with garbled speech, left facial drooping, left-sided weakness with labored respirations.  HENT: " Normocephalic, atraumatic  Eyes: PERRL, EOMI   Neck: Supple with limited range of motion particularly in flexion and extension secondary to a very short neck.  No tenderness along the bony prominences or paraspinal muscles.   Cardiovascular: Normal heart rate and Regular rhythm. No murmur  Thorax & Lungs: Clear and equal breath sounds with good excursion.  Moderate respiratory distress, no rhonchi, wheezing or rales.  Abdomen: Bowel sounds normal in all four quadrants. Soft, morbidly obese, nontender with no palpable masses.  Skin: Warm, Dry, huma colored skin.  Back: No cervical, thoracic, or lumbosacral tenderness.   Extremities: Peripheral pulses 4/4 No edema   Neurologic: Patient is alert and does respond appropriately to questions and commands but he does have garbled speech and appears to be in moderate distress.  He has complete left hemiplegia of the upper and lower extremities.  He has left facial drooping.  He has no sensory deficits.  Deep tendon reflexes are +2/4.    DIAGNOSTICS/PROCEDURES    LABS  Results for orders placed or performed during the hospital encounter of 09/12/20   CBC WITH DIFFERENTIAL   Result Value Ref Range    WBC 9.6 4.8 - 10.8 K/uL    RBC 6.03 4.70 - 6.10 M/uL    Hemoglobin 18.4 (H) 14.0 - 18.0 g/dL    Hematocrit 55.9 (H) 42.0 - 52.0 %    MCV 92.7 81.4 - 97.8 fL    MCH 30.5 27.0 - 33.0 pg    MCHC 32.9 (L) 33.7 - 35.3 g/dL    RDW 50.4 (H) 35.9 - 50.0 fL    Platelet Count 289 164 - 446 K/uL    MPV 10.6 9.0 - 12.9 fL    Neutrophils-Polys 60.40 44.00 - 72.00 %    Lymphocytes 25.50 22.00 - 41.00 %    Monocytes 11.20 0.00 - 13.40 %    Eosinophils 1.80 0.00 - 6.90 %    Basophils 0.60 0.00 - 1.80 %    Immature Granulocytes 0.50 0.00 - 0.90 %    Nucleated RBC 0.00 /100 WBC    Neutrophils (Absolute) 5.80 1.82 - 7.42 K/uL    Lymphs (Absolute) 2.45 1.00 - 4.80 K/uL    Monos (Absolute) 1.08 (H) 0.00 - 0.85 K/uL    Eos (Absolute) 0.17 0.00 - 0.51 K/uL    Baso (Absolute) 0.06 0.00 - 0.12 K/uL     Immature Granulocytes (abs) 0.05 0.00 - 0.11 K/uL    NRBC (Absolute) 0.00 K/uL      Labs reviewed by me    EKG  No results found for this or any previous visit.     RADIOLOGY/PROCEDURES  CT-HEAD W/O   Final Result         1.  Large acute parenchymal hemorrhage in the right cerebral hemisphere medially is identified which extends down into the right cerebral peduncle and edge of the brainstem.      2.  Hemorrhage is also present throughout the ventricular system. There is dilatation of the lateral ventricles which could be related to presence of hemorrhage within the third and fourth ventricles.      3.  Midline shift to the left side measures 6 mm. There is some mild effacement of sulci bilaterally.      These findings were discussed with LAZARO CONNELLY on 09/12/2020.      DX-CHEST-PORTABLE (1 VIEW)    (Results Pending)     Results and radiologist interpretation reviewed by me.     Intubation Procedure    Indication: Inability to manage airway secondary to acute CVA    Consent: Unable to be obtained due to patient's condition.    Medications Used: succinycholine 100 mg intravenously, propofol 200 mg IV push  Patient was eventually given Versed and an additional amount of succinylcholine.  Procedure: The patient was placed in the appropriate position.  Towels were laid behind the patient's shoulders to improve his hyperextension which was very difficult secondary to patient's neck.  Patient was medicated but his jaw was still very rigid and difficult to open.  I was able to get the glide scope in with extreme difficulty and could visualize the cords but could not pass the endotracheal tube as the patient's tongue would fall into place.  I attempted with the 4 inch laryngoscope and 8 Austrian endotracheal tube still to no avail.  Patient was unable to open his mouth wide enough for me to pass the endotracheal tube.  I called anesthesia who came down to help with the intubation.  Eventually we were able to give the  patient 5 of Versed and 200 mg of succinylcholine and they were able to pass the tube.  Positive end-tidal CO2 color change was obtained, patient had equal breath sounds with no gastric air sounds and his vital signs were improved.  We were able to bag the patient with a nasal airway and LMA throughout the difficulties that we had therefore I do not feel that the patient became hypoxic at any time.  We had continuous pulse oximetry and he never dropped below 85%.    COURSE & MEDICAL DECISION MAKING  Pertinent Labs & Imaging studies reviewed. (See chart for details)    5:57 PM - Patient seen and evaluated at bedside. Dr. Horta present at bedside as well. Ordered for ABO Rh confirm, troponin, and CMP to evaluate. Patient will be treated with Diprivan injection and Anectine injection for his symptoms. Differential diagnoses include, but are not limited to acute hemorrhagic stroke versus ischemic stroke.    6:00 PM Patient was intubated at this time as detailed above.      6:36 PM I was informed that the patient was waking up in CT. Ordered 5 mg of Versed at this time.    6:40 PM I discussed the patient's case and the above findings with Dr. Horta (Neurology) who informed me that the patient had a brain bleed and he has already talk to the intensivist (Dr. Tracy) who will evaluate the patient for hospitalization.    6:50 PM Radiology called to inform me of ct results as detailed above.      6:54 PM I discussed the patient's case and the above findings with Dr. Pooel (Neurosurgery) who will come see the patient and discuss his plan with the patient's significant other.    7:10 PM I updated the patients wife on radiology results as detailed above.  Patient's girlfriend/ex-wife was notified of the patient's grim diagnosis.  I told her that Dr. Poole would give her further information about what our plan of action was for him.  He will be admitted to the intensive care unit in critical condition    CRITICAL CARE  The very  real possibilty of a deterioration of this patient's condition required the highest level of my preparedness for sudden, emergent intervention.  I provided critical care services, which included medication orders, frequent reevaluations of the patient's condition and response to treatment, ordering and reviewing test results, and discussing the case with various consultants.  The critical care time associated with the care of the patient was 35 minutes. Review chart for interventions. This time is exclusive of any other billable procedures.        DISPOSITION:  Patient will be hospitalized by Dr. Tracy in critical condition.     FINAL IMPRESSION  1. Acute CVA (cerebrovascular accident) (HCC)    2. Intracranial bleeding (HCC)    3. Hypertension, unspecified type    4. Morbid obesity (HCC)    5. Left-sided weakness    6. Facial droop      Critical care was preformed for 35 mins.      Antione SORTO (Scribe), am scribing for, and in the presence of, Nichelle Tavarez D.O..    Electronically signed by: Antione Laguna ([place]), 9/12/2020    INichelle D.O. personally performed the services described in this documentation, as scribed by Antione Laguna in my presence, and it is both accurate and complete.  C  The note accurately reflects work and decisions made by me.  Nichelle Tavarez D.O.  9/13/2020  1:19 AM

## 2020-09-13 NOTE — ASSESSMENT & PLAN NOTE
Large right basal ganglia hemorrhage with midline shift. Likely HTN related  ICH score 2  3% hypertonic saline was started.   Neurosurgery was consulted and pt not candidate for surgery  Very poor prognosis

## 2020-09-13 NOTE — ANESTHESIA TIME REPORT
Anesthesia Start and Stop Event Times    No anesthesia events filed       Responsible Staff    No responsible staff documented.       Preop Diagnosis (Free Text):  Pre-op Diagnosis             Preop Diagnosis (Codes):    Post op Diagnosis  Difficult airway      Premium Reason  A. 3PM - 7AM    Comments:

## 2020-09-13 NOTE — PROGRESS NOTES
2 RN skin check performed. Patient skin red on chest, dry and flaky on feet. Sacral area is pink and blanching. No other areas of skin breakdown noted.

## 2020-09-13 NOTE — ED NOTES
Received report from day shift RN Poonam. Assessed patient and updated care board. Plan of care discussed with  Girlfriend at bedside.    Noted patient restless, coughing, and foaming at mouth. Immediately suctioned via ETT, moderate white thick secretions noted. Turned on OG suction, 100 ml, brownish thick output noted. RT called to bedside.

## 2020-09-13 NOTE — PROGRESS NOTES
Patient was reseen and re-examined.    Right pupil 4 mm fixed, left pupil 3 mm fixed  No eye opening, intubated, sedated, no purposeful movement, no response to me to deep stimuli.    Advanced directive and POA reviewed by ICU staff.    No NS intervention recommended.    Family will likely choose comfort care later this afternoon which I think is appropriate given patient's advanced directive and abysmal prognosis.    Discussed with nursing and critical care team.

## 2020-09-13 NOTE — CONSULTS
DATE OF SERVICE:  09/12/2020    REFERRING PHYSICIAN:  Nichelle Tavarez DO    REASON FOR CONSULTATION:  Acute intracranial hemorrhage.    CLINICAL HISTORY:  The patient is a 60-year-old male who is right-handed.  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  He is an avid golfer.  Apparently, the patient was found down at a   massage parlor.  He was still awake when EMS arrived and complained of   left-sided weakness and numbness and facial droop.  When the patient arrived   or shortly thereafter, his airway became a concern.  The patient underwent   intubation and after intubation, a head CT was performed.  The head CT shows a   large basal ganglia bleed on the right side with extension into the right   cerebral peduncle and brainstem with extension into the ventricular system   with dilation of the lateral ventricles.  There was 6 mm of right to left   shift.  On exam, for me, the patient is intubated, he was just given   labetalol.  His systolic blood pressure is 150s.  His heart rate is 90s.  His   pupils are fixed and dilated at 4 mm.  He has decorticate posturing on the   right and decerebrate posturing on the left.  The patient is vented obviously.    The patient's sodium is 135.    IMPRESSION AND PLAN:  Acute hemorrhagic basal ganglia hemorrhage.  This is   most likely a hypertensive hemorrhage.  The patient has other comorbidities   such as a body mass index of 42.77 and diabetes.  Frankly, this is an   unsurvivable event.  I had an extensive discussion with the daughter   telephonically and with the patient's significant other who has the power of   .  She is going to find his living will.  She states as far as she   knows that the patient would not want any further aggressive treatment.  We   discussed the morbidity and mortality being more than 98% for this condition.    We talked about potentially going in for a craniotomy or ventriculostomy.    The family  has declined given that they state that he would not want to live   with a significant deficit.  I explained once again that this is a   life-threatening event and they would like us to try to keep him alive until   family arrives from [place].  There is nothing I would recommend at this   point neurosurgically.       ____________________________________     MD GISELLE BHAKTA / JAYA    DD:  09/12/2020 20:11:14  DT:  09/13/2020 00:50:16    D#:  [id number]  Job#:  [id number]

## 2020-09-13 NOTE — ED NOTES
"Jeff Allred  Chief Complaint   Patient presents with   • Possible Stroke     [facility] had sudden onset of L sided weakness. LKN [id number]. Pt arrives with difficulty breathing and snoring respirations, no airway interventions performed by OMEGA. MD Tavarez decision to intubate pt on arrival. Pt able to answer questions with difficulty speaking. Unable to perform full NIH scale at this time d/t pt unstable status. Pt does have L sided weakness.    /67   Pulse 98   Resp (!) 30   Ht 1.676 m (5' 6\")   Wt 120.2 kg (265 lb)   SpO2 99%   "

## 2020-09-13 NOTE — ED NOTES
Report given to MIKE Luis, patient transferring to R102 on zoll monitor with ventilator with RT via gurney. All belongings sent up with patient.

## 2020-09-13 NOTE — THERAPY
SLP Contact Note:       09/13/20 0909   Treatment Variance   Reason For Missed Therapy Medical - Patient Is Not Medically Stable   Total Time Spent   Total Time Spent (Mins) 2   Interdisciplinary Plan of Care Collaboration   IDT Collaboration with  Nursing   Collaboration Comments Order received for a cognitive-linguistic evaluation. Pt is currently on the vent and per RN, family discussing possible comfort care. SLP to follow as POC is determined.

## 2020-09-13 NOTE — ANESTHESIA PROCEDURE NOTES
Airway    Date/Time: 9/12/2020 6:22 PM  Performed by: Suly Infante M.D.  Authorized by: Suly Infante M.D.     Location:  ED  Urgency:  Emergent  Difficult Airway: Yes     Large tongue and neck; unable to be intubated after presumed CVA by ED doc. Anesthesia called and successfully intubated  Consent Cannot be Obtained Due to Urgency: Yes    Indications for Airway Management:  Respiratory failure, airway protection and CNS depression (presumed CVA; nonresponsive)  Additional Indication: COVID-19 Active or PUI (no covid result yet)  COVID-19 Comments:   I performed this patient's endotracheal intubation which required substantially increased work beyond that of the typical emergency endotracheal intubation based on the need to use COVID-19 precautions which required increased time and skill employed through use of personal protective equipment in preparing for and during the intubation as well as the additional time spent properly disposing of the equipment upon completion of the procedure.  Spontaneous Ventilation: absent    Sedation Level:  Deep  Preoxygenated: Yes    Patient Position:  Sniffing  Final Airway Type:  Endotracheal airway  Final Endotracheal Airway:  ETT  Cuffed: Yes    Technique Used for Successful ETT Placement:  Direct laryngoscopy  Devices/Methods Used in Placement:  Cricoid pressure    Insertion Site:  Oral  Blade Type:  Justine  Laryngoscope Blade/Videolaryngoscope Blade Size:  4  ETT Size (mm):  7.5  Measured from:  Teeth  ETT to Teeth (cm):  23  Placement Verified by: auscultation and capnometry    Cormack-Lehane Classification:  Grade III - view of epiglottis only  Number of Attempts at Approach:  1  Ventilation Between Attempts:  Supraglottic airway  Number of Other Approaches Attempted:  3 or more   I was called to ED for difficult intubation after ED doc was unable to intubate x multiple attempts. When I arrived he had an LMA in place and was being BMV by RT. ED doc gave  midaz and more sux and I intubated patient x 1st attempt.

## 2020-09-13 NOTE — PROGRESS NOTES
Critical Care Progress Note    Date of admission  9/12/2020    Chief Complaint  60 y.o. male w/ HTN, HLD, DAWIT and DM who presented 9/12/2020 with right deep cerebral nuclei ICH and IVH manifest as left sided weakness and dysarthria. Initially arrived as a GCS 14, became obtunded and was intubated. Per ERP difficult intubation by anesthesiology. Not on any antiplatelet or AC per review of EMR.  CT head with large right basal ganglia hemorrhage with extension to ventricles bilaterally, 6mm midline shift to the left.      Hospital Course    9/12 admitted to ICU        Interval Problem Update  Reviewed last 24 hour events:  Pt remains critically ill  On full vent support, 40%/8  HR in 80-90s  SBP in 120s  Sedated with propofol and fentanyl gtt  On 3% Hypertonic saline    Review of Systems  Review of Systems   Reason unable to perform ROS: intubated, sedated, RASS -4, unable to perform.        Vital Signs for last 24 hours   Temp:  [36.8 °C (98.2 °F)-37.5 °C (99.5 °F)] 37.5 °C (99.5 °F)  Pulse:  [] 100  Resp:  [19-30] 19  BP: (112-194)/(56-94) 121/63  SpO2:  [93 %-99 %] 93 %    Hemodynamic parameters for last 24 hours       Respiratory Information for the last 24 hours  Vent Mode: APVCMV  Rate (breaths/min): 24  Vt Target (mL): 380  PEEP/CPAP: 8  MAP: 12  Control VTE (exp VT): 382    Physical Exam   Physical Exam  Vitals signs and nursing note reviewed.   Constitutional:       General: He is not in acute distress.     Appearance: He is not ill-appearing, toxic-appearing or diaphoretic.   HENT:      Head: Normocephalic.      Mouth/Throat:      Comments: ET tube in place  Cardiovascular:      Rate and Rhythm: Normal rate and regular rhythm.      Pulses: Normal pulses.      Heart sounds: Normal heart sounds. No murmur.   Pulmonary:      Effort: Pulmonary effort is normal. No respiratory distress.      Breath sounds: Normal breath sounds. No wheezing, rhonchi or rales.   Abdominal:      General: Bowel sounds are  normal. There is no distension.      Palpations: Abdomen is soft.      Tenderness: There is no abdominal tenderness. There is no guarding.   Musculoskeletal:         General: No swelling or tenderness.   Skin:     General: Skin is warm.      Coloration: Skin is not jaundiced.   Neurological:      Mental Status: He is alert.      Comments: Unable to obtain due to mental status         Medications  Current Facility-Administered Medications   Medication Dose Route Frequency Provider Last Rate Last Dose   • ipratropium-albuterol (DUONEB) nebulizer solution  3 mL Nebulization Q2HRS PRN (RT) Darshan Tracy M.D.       • famotidine (PEPCID) tablet 20 mg  20 mg Enteral Tube Q12HRS Darshan Tracy M.D.   Stopped at 09/12/20 1930    Or   • famotidine (PEPCID) injection 20 mg  20 mg Intravenous Q12HRS Darshan Tracy M.D.       • senna-docusate (PERICOLACE or SENOKOT S) 8.6-50 MG per tablet 2 Tab  2 Tab Enteral Tube BID Darshan Tracy M.D.   Stopped at 09/12/20 1930    And   • polyethylene glycol/lytes (MIRALAX) PACKET 1 Packet  1 Packet Enteral Tube QDAY PRN Darshan Tracy M.D.        And   • magnesium hydroxide (MILK OF MAGNESIA) suspension 30 mL  30 mL Enteral Tube QDAY PRN Darshan Tracy M.D.        And   • bisacodyl (DULCOLAX) suppository 10 mg  10 mg Rectal QDAY PRN Darshan Tracy M.D.       • MD Alert...ICU Electrolyte Replacement per Pharmacy   Other PHARMACY TO DOSE Darshan Tracy M.D.       • lidocaine (XYLOCAINE) 1 % injection 1-2 mL  1-2 mL Tracheal Tube Q30 MIN PRN Darshan Tracy M.D.       • fentaNYL (SUBLIMAZE) injection 100 mcg  100 mcg Intravenous Q15 MIN PRN Darshan Tracy M.D.        And   • fentaNYL (SUBLIMAZE) injection 200 mcg  200 mcg Intravenous Q15 MIN PRN Darshan Tracy M.D.        And   • fentaNYL (SUBLIMAZE) 50 mcg/mL in 50mL (Continuous Infusion)   Intravenous Continuous Darshan Tracy M.D. 2 mL/hr at 09/12/20 1941      And   • propofol (DIPRIVAN) injection  0-80 mcg/kg/min Intravenous  Continuous Darshan Tracy M.D. 7.2 mL/hr at 09/13/20 0645 10 mcg/kg/min at 09/13/20 0645   • Respiratory Therapy Consult   Nebulization Continuous RT Darshan Tracy M.D.       • NS infusion   Intravenous Continuous Darshan Tracy M.D. 80 mL/hr at 09/12/20 2245     • LORazepam (ATIVAN) injection 2 mg  2 mg Intravenous Q5 MIN PRN Darshan Tracy M.D.       • acetaminophen (TYLENOL) tablet 650 mg  650 mg Oral Q4HRS PRN Darshan Tracy M.D.        Or   • acetaminophen (TYLENOL) suppository 650 mg  650 mg Rectal Q4HRS PRN Darshan Tracy M.D.       • ondansetron (ZOFRAN ODT) dispertab 4 mg  4 mg Oral Q4HRS PRN Darshan Tracy M.D.        Or   • ondansetron (ZOFRAN) syringe/vial injection 4 mg  4 mg Intravenous Q4HRS PRN Darshan Tarcy M.D.       • niCARdipine (CARDENE) 25 mg in  mL Infusion  0-15 mg/hr Intravenous Continuous Darshan Tracy M.D. 50 mL/hr at 09/13/20 0645 5 mg/hr at 09/13/20 0645   • labetalol (NORMODYNE/TRANDATE) injection 10 mg  10 mg Intravenous Q4HRS PRN Darshan Tracy M.D.       • enalaprilat (VASOTEC) injection 1.25 mg  1.25 mg Intravenous Q6HRS PRN Darshan Tracy M.D.       • insulin regular (HumuLIN R,NovoLIN R) injection  1-6 Units Subcutaneous Q6HRS Darshan Tracy M.D.   Stopped at 09/13/20 0600    And   • glucose 4 g chewable tablet 16 g  16 g Oral Q15 MIN PRN Darshan Tracy M.D.        And   • dextrose 50% (D50W) injection 50 mL  50 mL Intravenous Q15 MIN PRN Darshan Tracy M.D.       • acetaminophen (TYLENOL) suppository 325 mg  325 mg Rectal Q6HRS PRN Darshan Tracy M.D.       • 3% sodium chloride (HYPERTONIC SALINE) 500mL infusion 500 mL  500 mL Intravenous Continuous Darshan Tracy M.D. 20 mL/hr at 09/13/20 0727         Fluids    Intake/Output Summary (Last 24 hours) at 9/13/2020 0741  Last data filed at 9/13/2020 0600  Gross per 24 hour   Intake 1246.43 ml   Output 2225 ml   Net -978.57 ml       Laboratory  Recent Labs     09/12/20  2013   ISTATAPH 7.314*   ISTATAPCO2  45.3*   ISTATAPO2 119*   ISTATATCO2 24   EHLQKNU7CPA 98   ISTATARTHCO3 23.0   ISTATARTBE -3   ISTATTEMP 98.2 F   ISTATFIO2 100   ISTATSPEC Arterial   ISTATAPHTC 7.317*   BITLEHSI0DX 118*         Recent Labs     09/12/20 1848 09/13/20  0425   SODIUM 136 142   POTASSIUM 4.4 4.5   CHLORIDE 100 105   CO2 21 22   BUN 14 11   CREATININE 0.98 0.96   MAGNESIUM  --  2.2   PHOSPHORUS  --  3.9   CALCIUM 9.1 9.0     Recent Labs     09/12/20 1848 09/13/20  0425   ALTSGPT 30  --    ASTSGOT 20  --    ALKPHOSPHAT 78  --    TBILIRUBIN 0.3  --    GLUCOSE 150* 152*     Recent Labs     09/12/20 1752 09/12/20 1848 09/13/20  0425   WBC 9.6  --  13.5*   NEUTSPOLYS 60.40  --  80.50*   LYMPHOCYTES 25.50  --  8.40*   MONOCYTES 11.20  --  10.40   EOSINOPHILS 1.80  --  0.10   BASOPHILS 0.60  --  0.20   ASTSGOT  --  20  --    ALTSGPT  --  30  --    ALKPHOSPHAT  --  78  --    TBILIRUBIN  --  0.3  --      Recent Labs     09/12/20 1752 09/13/20  0425   RBC 6.03 5.69   HEMOGLOBIN 18.4* 17.5   HEMATOCRIT 55.9* 53.4*   PLATELETCT 289 229   PROTHROMBTM 12.4  --    APTT 23.7*  --    INR 0.90  --        Imaging  X-Ray:  I have personally reviewed the images and compared with prior images.  CT:    Reviewed    Assessment/Plan  * ICH (intracerebral hemorrhage) (HCC)  Assessment & Plan  Large right basal ganglia hemorrhage with midline shift. Likely HTN related  ICH score 2  3% hypertonic saline was started.   Neurosurgery was consulted and pt not candidate for surgery  Very poor prognosis      Goals of care, counseling/discussion  Assessment & Plan  I had long discussion with family (girlfriend, daughter/son-in-law and granddaughters) regarding patient's critical condition. I discussed about comfort care. Family brought advance directive packet and pt has a clear documentation that he does not want to be sustained with life support.   I also showed the entire family the CT brain. Family are grieving and tearful.   They later decided for comfort  care  I offered my deepest condolescence     Acute respiratory failure (HCC)  Assessment & Plan  Intubated in the ED for airway protect  Likely aspirated monitor for infection/bronch  Monitor off abx for now  RT/O2 protocols  Titration of ventilator therapy based on ABGs, waveform analysis and patient's status  Not candidate for SAT/SBT      HTN (hypertension)  Assessment & Plan  Goal SBP < 140  Cardene gtt  Resume oral agents when appropriate    DAWIT (obstructive sleep apnea)  Assessment & Plan  Hx of     DM (diabetes mellitus) (HCC)  Assessment & Plan  SSI   Accuchecks  Hypoglycemia protocol       VTE:  Contraindicated  Ulcer: H2 Antagonist  Lines: Central Line  Ongoing indication addressed, Arterial Line  Ongoing indication addressed and Irizarry Catheter  Ongoing indication addressed    I have performed a physical exam and reviewed and updated ROS and Plan today (9/13/2020). In review of yesterday's note (9/12/2020), there are no changes except as documented above.     Discussed patient condition and risk of morbidity and/or mortality with Family, RN, RT, Pharmacy and Charge nurse / hot rounds  The patient remains critically ill.  Critical care time = 90 minutes in directly providing and coordinating critical care and extensive data review.  No time overlap and excludes procedures.  D/w Dr. Poole, NSGY

## 2020-09-13 NOTE — DISCHARGE PLANNING
SOCIAL WORK NOTE    Received call from bedside RN informing that pt's significant other would like assistance in tracking down pt's car and belongings stating that she does not know where pt was when [facility] picked him up. Bedside RN requested that this SW contact pt's significant other, Veronica (ph#: [phone]), to assist. This SW called Veronica, but she did not answer the phone; left a message requesting a return call.    PLAN: SS/CM will remain available to provide support and assist as needed.

## 2020-09-13 NOTE — FLOWSHEET NOTE
Conscious Sedation Respiratory Update    FiO2%: 40 % (09/13/20 1050)          Events/Summary/Plan: Pt was made comfort care.  Pt extubated per 's order and the family's wishes.  Family is present. (09/13/20 4993)

## 2020-09-14 NOTE — ASSESSMENT & PLAN NOTE
I had long discussion with family (girlfriend, daughter/son-in-law and granddaughters) regarding patient's critical condition. I discussed about comfort care. Family brought advance directive packet and pt has a clear documentation that he does not want to be sustained with life support.   I also showed the entire family the CT brain. Family are grieving and tearful.   They later decided for comfort care  I offered my deepest condolescence

## 2020-09-14 NOTE — DISCHARGE SUMMARY
Death Summary    Cause of Death  Cardiac arrest due to extensive basal ganglia hemorrhage due to uncontrolled hypertension     Comorbid Conditions at the Time of Death  Principal Problem:    ICH (intracerebral hemorrhage) (HCC) POA: Unknown      Overview:         Active Problems:    Acute respiratory failure (HCC) POA: Unknown    Goals of care, counseling/discussion POA: Unknown    HTN (hypertension) POA: Unknown    DM (diabetes mellitus) (HCC) POA: Unknown    DAWIT (obstructive sleep apnea) POA: Unknown  Resolved Problems:    * No resolved hospital problems. *      History of Presenting Illness and Hospital Course  60 y.o. male w/ HTN, HLD, DAWIT and DM who presented 2020 with right deep basal ganglia ICH extending to IVH bilaterally and midline shift. Pt was subsequently intubated, sedated. Neurosurgery was consulted and deemed pt not surgical candidate. Pt has very poor neurological prognosis. Family was informed regarding patient's critical condition. Pt does have advance directive and living will that stated he does not want to be placed on life-prolonging measures. The following day, I had family meeting with DPOA, and other family members and they have decided for comfort care. Comfort care measures were subsequently instituted and pt   at 1433    Death Date: 20   Death Time: 1433         Pronounced By (RN1): Thais Bazan RN  Pronounced By (RN2): Dulce Chowdhury RN

## 2024-07-05 NOTE — PROCEDURE: DIAGNOSIS COMMENT
Attending Attestation (For Attendings USE Only)...
Detail Level: Simple
Comment: poss mild dissecting cellulitis on scalp/AKN overlap?